# Patient Record
Sex: FEMALE | Race: WHITE | NOT HISPANIC OR LATINO | Employment: UNEMPLOYED | URBAN - METROPOLITAN AREA
[De-identification: names, ages, dates, MRNs, and addresses within clinical notes are randomized per-mention and may not be internally consistent; named-entity substitution may affect disease eponyms.]

---

## 2017-10-29 ENCOUNTER — HOSPITAL ENCOUNTER (OUTPATIENT)
Facility: HOSPITAL | Age: 22
Setting detail: OBSERVATION
Discharge: HOME | End: 2017-10-31
Attending: EMERGENCY MEDICINE | Admitting: INTERNAL MEDICINE

## 2017-10-29 DIAGNOSIS — E86.0 DEHYDRATION: ICD-10-CM

## 2017-10-29 DIAGNOSIS — R79.89 ELEVATED LIVER FUNCTION TESTS: ICD-10-CM

## 2017-10-29 DIAGNOSIS — F10.939 ALCOHOL WITHDRAWAL SYNDROME WITH COMPLICATION (HCC): Primary | ICD-10-CM

## 2017-10-29 LAB
ALBUMIN SERPL-MCNC: 4.6 G/DL (ref 3.2–4.8)
ALBUMIN/GLOB SERPL: 1.7 G/DL (ref 1.5–2.5)
ALP SERPL-CCNC: 232 U/L (ref 25–100)
ALT SERPL W P-5'-P-CCNC: 154 U/L (ref 7–40)
AMPHET+METHAMPHET UR QL: NEGATIVE
AMPHETAMINES UR QL: NEGATIVE
ANION GAP SERPL CALCULATED.3IONS-SCNC: 12 MMOL/L (ref 3–11)
APAP SERPL-MCNC: <10 MCG/ML (ref 0–30)
AST SERPL-CCNC: 489 U/L (ref 0–33)
B-HCG UR QL: NEGATIVE
BACTERIA UR QL AUTO: ABNORMAL /HPF
BARBITURATES UR QL SCN: NEGATIVE
BASOPHILS # BLD AUTO: 0.04 10*3/MM3 (ref 0–0.2)
BASOPHILS NFR BLD AUTO: 0.8 % (ref 0–1)
BENZODIAZ UR QL SCN: NEGATIVE
BILIRUB SERPL-MCNC: 2.2 MG/DL (ref 0.3–1.2)
BILIRUB UR QL STRIP: ABNORMAL
BUN BLD-MCNC: 6 MG/DL (ref 9–23)
BUN/CREAT SERPL: 10 (ref 7–25)
BUPRENORPHINE SERPL-MCNC: NEGATIVE NG/ML
CALCIUM SPEC-SCNC: 9.4 MG/DL (ref 8.7–10.4)
CANNABINOIDS SERPL QL: NEGATIVE
CHLORIDE SERPL-SCNC: 100 MMOL/L (ref 99–109)
CLARITY UR: CLEAR
CO2 SERPL-SCNC: 27 MMOL/L (ref 20–31)
COCAINE UR QL: NEGATIVE
COLOR UR: ABNORMAL
CREAT BLD-MCNC: 0.6 MG/DL (ref 0.6–1.3)
DEPRECATED RDW RBC AUTO: 50.9 FL (ref 37–54)
EOSINOPHIL # BLD AUTO: 0.03 10*3/MM3 (ref 0–0.3)
EOSINOPHIL NFR BLD AUTO: 0.6 % (ref 0–3)
ERYTHROCYTE [DISTWIDTH] IN BLOOD BY AUTOMATED COUNT: 13.4 % (ref 11.3–14.5)
ETHANOL BLD-MCNC: <10 MG/DL (ref 0–10)
GFR SERPL CREATININE-BSD FRML MDRD: 124 ML/MIN/1.73
GLOBULIN UR ELPH-MCNC: 2.7 GM/DL
GLUCOSE BLD-MCNC: 88 MG/DL (ref 70–100)
GLUCOSE UR STRIP-MCNC: NEGATIVE MG/DL
HCT VFR BLD AUTO: 44.7 % (ref 34.5–44)
HGB BLD-MCNC: 15.2 G/DL (ref 11.5–15.5)
HGB UR QL STRIP.AUTO: NEGATIVE
HYALINE CASTS UR QL AUTO: ABNORMAL /LPF
IMM GRANULOCYTES # BLD: 0.01 10*3/MM3 (ref 0–0.03)
IMM GRANULOCYTES NFR BLD: 0.2 % (ref 0–0.6)
INTERNAL NEGATIVE CONTROL: NEGATIVE
INTERNAL POSITIVE CONTROL: POSITIVE
KETONES UR QL STRIP: ABNORMAL
LEUKOCYTE ESTERASE UR QL STRIP.AUTO: ABNORMAL
LIPASE SERPL-CCNC: 69 U/L (ref 6–51)
LYMPHOCYTES # BLD AUTO: 0.44 10*3/MM3 (ref 0.6–4.8)
LYMPHOCYTES NFR BLD AUTO: 9 % (ref 24–44)
Lab: NORMAL
MCH RBC QN AUTO: 35.3 PG (ref 27–31)
MCHC RBC AUTO-ENTMCNC: 34 G/DL (ref 32–36)
MCV RBC AUTO: 104 FL (ref 80–99)
METHADONE UR QL SCN: NEGATIVE
MONOCYTES # BLD AUTO: 0.51 10*3/MM3 (ref 0–1)
MONOCYTES NFR BLD AUTO: 10.4 % (ref 0–12)
NEUTROPHILS # BLD AUTO: 3.86 10*3/MM3 (ref 1.5–8.3)
NEUTROPHILS NFR BLD AUTO: 79 % (ref 41–71)
NITRITE UR QL STRIP: POSITIVE
OPIATES UR QL: NEGATIVE
OXYCODONE UR QL SCN: NEGATIVE
PCP UR QL SCN: NEGATIVE
PH UR STRIP.AUTO: 6.5 [PH] (ref 5–8)
PLATELET # BLD AUTO: 92 10*3/MM3 (ref 150–450)
PMV BLD AUTO: 10 FL (ref 6–12)
POTASSIUM BLD-SCNC: 3.4 MMOL/L (ref 3.5–5.5)
PROPOXYPH UR QL: NEGATIVE
PROT SERPL-MCNC: 7.3 G/DL (ref 5.7–8.2)
PROT UR QL STRIP: ABNORMAL
RBC # BLD AUTO: 4.3 10*6/MM3 (ref 3.89–5.14)
RBC # UR: ABNORMAL /HPF
REF LAB TEST METHOD: ABNORMAL
SALICYLATES SERPL-MCNC: <1 MG/DL (ref 0–29)
SODIUM BLD-SCNC: 139 MMOL/L (ref 132–146)
SP GR UR STRIP: 1.02 (ref 1–1.03)
SQUAMOUS #/AREA URNS HPF: ABNORMAL /HPF
TRICYCLICS UR QL SCN: NEGATIVE
UROBILINOGEN UR QL STRIP: ABNORMAL
WBC NRBC COR # BLD: 4.89 10*3/MM3 (ref 3.5–10.8)
WBC UR QL AUTO: ABNORMAL /HPF

## 2017-10-29 PROCEDURE — 80306 DRUG TEST PRSMV INSTRMNT: CPT | Performed by: EMERGENCY MEDICINE

## 2017-10-29 PROCEDURE — 96376 TX/PRO/DX INJ SAME DRUG ADON: CPT

## 2017-10-29 PROCEDURE — 96375 TX/PRO/DX INJ NEW DRUG ADDON: CPT

## 2017-10-29 PROCEDURE — 99220 PR INITIAL OBSERVATION CARE/DAY 70 MINUTES: CPT | Performed by: INTERNAL MEDICINE

## 2017-10-29 PROCEDURE — 25010000002 KETOROLAC TROMETHAMINE PER 15 MG: Performed by: EMERGENCY MEDICINE

## 2017-10-29 PROCEDURE — 96361 HYDRATE IV INFUSION ADD-ON: CPT

## 2017-10-29 PROCEDURE — 25010000002 LORAZEPAM PER 2 MG: Performed by: EMERGENCY MEDICINE

## 2017-10-29 PROCEDURE — 83690 ASSAY OF LIPASE: CPT | Performed by: EMERGENCY MEDICINE

## 2017-10-29 PROCEDURE — G0378 HOSPITAL OBSERVATION PER HR: HCPCS

## 2017-10-29 PROCEDURE — 25010000002 LORAZEPAM PER 2 MG: Performed by: INTERNAL MEDICINE

## 2017-10-29 PROCEDURE — 80307 DRUG TEST PRSMV CHEM ANLYZR: CPT | Performed by: EMERGENCY MEDICINE

## 2017-10-29 PROCEDURE — 99284 EMERGENCY DEPT VISIT MOD MDM: CPT

## 2017-10-29 PROCEDURE — 81001 URINALYSIS AUTO W/SCOPE: CPT | Performed by: EMERGENCY MEDICINE

## 2017-10-29 PROCEDURE — 96365 THER/PROPH/DIAG IV INF INIT: CPT

## 2017-10-29 PROCEDURE — 96366 THER/PROPH/DIAG IV INF ADDON: CPT

## 2017-10-29 PROCEDURE — 25010000002 MAGNESIUM SULFATE PER 500 MG OF MAGNESIUM: Performed by: INTERNAL MEDICINE

## 2017-10-29 PROCEDURE — 85025 COMPLETE CBC W/AUTO DIFF WBC: CPT | Performed by: EMERGENCY MEDICINE

## 2017-10-29 PROCEDURE — 87086 URINE CULTURE/COLONY COUNT: CPT | Performed by: EMERGENCY MEDICINE

## 2017-10-29 PROCEDURE — 96374 THER/PROPH/DIAG INJ IV PUSH: CPT

## 2017-10-29 PROCEDURE — 80053 COMPREHEN METABOLIC PANEL: CPT | Performed by: EMERGENCY MEDICINE

## 2017-10-29 PROCEDURE — 25010000002 THIAMINE PER 100 MG: Performed by: INTERNAL MEDICINE

## 2017-10-29 RX ORDER — HYDROXYZINE PAMOATE 50 MG/1
50 CAPSULE ORAL DAILY PRN
COMMUNITY
End: 2018-07-30

## 2017-10-29 RX ORDER — CLOBETASOL PROPIONATE 0.5 MG/G
CREAM TOPICAL EVERY 12 HOURS SCHEDULED
Status: DISCONTINUED | OUTPATIENT
Start: 2017-10-29 | End: 2017-10-31 | Stop reason: HOSPADM

## 2017-10-29 RX ORDER — LORAZEPAM 2 MG/ML
1 INJECTION INTRAMUSCULAR EVERY 8 HOURS
Status: DISCONTINUED | OUTPATIENT
Start: 2017-10-30 | End: 2017-10-31

## 2017-10-29 RX ORDER — ONDANSETRON 2 MG/ML
4 INJECTION INTRAMUSCULAR; INTRAVENOUS EVERY 6 HOURS PRN
Status: DISCONTINUED | OUTPATIENT
Start: 2017-10-29 | End: 2017-10-31 | Stop reason: HOSPADM

## 2017-10-29 RX ORDER — CLOBETASOL PROPIONATE 0.5 MG/G
1 CREAM TOPICAL 2 TIMES DAILY
COMMUNITY
End: 2018-07-30

## 2017-10-29 RX ORDER — DEXTROSE AND SODIUM CHLORIDE 5; .45 G/100ML; G/100ML
125 INJECTION, SOLUTION INTRAVENOUS CONTINUOUS
Status: DISCONTINUED | OUTPATIENT
Start: 2017-10-29 | End: 2017-10-31

## 2017-10-29 RX ORDER — SODIUM CHLORIDE 9 MG/ML
250 INJECTION, SOLUTION INTRAVENOUS CONTINUOUS
Status: DISCONTINUED | OUTPATIENT
Start: 2017-10-29 | End: 2017-10-30

## 2017-10-29 RX ORDER — KETOROLAC TROMETHAMINE 15 MG/ML
10 INJECTION, SOLUTION INTRAMUSCULAR; INTRAVENOUS ONCE
Status: COMPLETED | OUTPATIENT
Start: 2017-10-29 | End: 2017-10-29

## 2017-10-29 RX ORDER — ALBUTEROL SULFATE 90 UG/1
2 AEROSOL, METERED RESPIRATORY (INHALATION) EVERY 4 HOURS PRN
Status: DISCONTINUED | OUTPATIENT
Start: 2017-10-29 | End: 2017-10-31 | Stop reason: HOSPADM

## 2017-10-29 RX ORDER — SODIUM CHLORIDE 0.9 % (FLUSH) 0.9 %
1-10 SYRINGE (ML) INJECTION AS NEEDED
Status: DISCONTINUED | OUTPATIENT
Start: 2017-10-29 | End: 2017-10-31 | Stop reason: HOSPADM

## 2017-10-29 RX ORDER — HYDROXYZINE HYDROCHLORIDE 25 MG/1
50 TABLET, FILM COATED ORAL EVERY 6 HOURS PRN
Status: DISCONTINUED | OUTPATIENT
Start: 2017-10-29 | End: 2017-10-31 | Stop reason: HOSPADM

## 2017-10-29 RX ORDER — LORAZEPAM 2 MG/ML
1 INJECTION INTRAMUSCULAR EVERY 6 HOURS
Status: COMPLETED | OUTPATIENT
Start: 2017-10-29 | End: 2017-10-30

## 2017-10-29 RX ORDER — POTASSIUM CHLORIDE 1.5 G/1.77G
40 POWDER, FOR SOLUTION ORAL AS NEEDED
Status: DISCONTINUED | OUTPATIENT
Start: 2017-10-29 | End: 2017-10-31 | Stop reason: HOSPADM

## 2017-10-29 RX ORDER — POTASSIUM CHLORIDE 750 MG/1
40 CAPSULE, EXTENDED RELEASE ORAL AS NEEDED
Status: DISCONTINUED | OUTPATIENT
Start: 2017-10-29 | End: 2017-10-31 | Stop reason: HOSPADM

## 2017-10-29 RX ORDER — LORAZEPAM 2 MG/ML
0.5 INJECTION INTRAMUSCULAR ONCE
Status: COMPLETED | OUTPATIENT
Start: 2017-10-29 | End: 2017-10-29

## 2017-10-29 RX ORDER — POTASSIUM CHLORIDE 7.45 MG/ML
10 INJECTION INTRAVENOUS
Status: DISCONTINUED | OUTPATIENT
Start: 2017-10-29 | End: 2017-10-31 | Stop reason: HOSPADM

## 2017-10-29 RX ORDER — ALBUTEROL SULFATE 90 UG/1
2 AEROSOL, METERED RESPIRATORY (INHALATION) EVERY 4 HOURS PRN
Status: ON HOLD | COMMUNITY
End: 2019-03-29

## 2017-10-29 RX ORDER — ONDANSETRON 4 MG/1
4 TABLET, FILM COATED ORAL EVERY 6 HOURS PRN
Status: DISCONTINUED | OUTPATIENT
Start: 2017-10-29 | End: 2017-10-31 | Stop reason: HOSPADM

## 2017-10-29 RX ORDER — ACETAMINOPHEN 325 MG/1
650 TABLET ORAL ONCE
Status: COMPLETED | OUTPATIENT
Start: 2017-10-29 | End: 2017-10-29

## 2017-10-29 RX ADMIN — CLOBETASOL PROPIONATE: 0.5 CREAM TOPICAL at 21:48

## 2017-10-29 RX ADMIN — SODIUM CHLORIDE 1000 ML: 9 INJECTION, SOLUTION INTRAVENOUS at 11:28

## 2017-10-29 RX ADMIN — DEXTROSE AND SODIUM CHLORIDE 150 ML/HR: 5; 450 INJECTION, SOLUTION INTRAVENOUS at 16:23

## 2017-10-29 RX ADMIN — SODIUM CHLORIDE 250 ML/HR: 9 INJECTION, SOLUTION INTRAVENOUS at 16:01

## 2017-10-29 RX ADMIN — LORAZEPAM 0.5 MG: 2 INJECTION INTRAMUSCULAR; INTRAVENOUS at 15:00

## 2017-10-29 RX ADMIN — SODIUM CHLORIDE 1000 ML: 9 INJECTION, SOLUTION INTRAVENOUS at 13:38

## 2017-10-29 RX ADMIN — POTASSIUM CHLORIDE 40 MEQ: 750 CAPSULE, EXTENDED RELEASE ORAL at 21:31

## 2017-10-29 RX ADMIN — LORAZEPAM 1 MG: 2 INJECTION INTRAMUSCULAR; INTRAVENOUS at 16:45

## 2017-10-29 RX ADMIN — LORAZEPAM 1 MG: 2 INJECTION INTRAMUSCULAR; INTRAVENOUS at 21:32

## 2017-10-29 RX ADMIN — KETOROLAC TROMETHAMINE 10 MG: 15 INJECTION, SOLUTION INTRAMUSCULAR; INTRAVENOUS at 14:04

## 2017-10-29 RX ADMIN — DEXTROSE AND SODIUM CHLORIDE 150 ML/HR: 5; 450 INJECTION, SOLUTION INTRAVENOUS at 23:11

## 2017-10-29 RX ADMIN — THIAMINE HYDROCHLORIDE 100 ML/HR: 100 INJECTION, SOLUTION INTRAMUSCULAR; INTRAVENOUS at 16:45

## 2017-10-29 RX ADMIN — ACETAMINOPHEN 650 MG: 325 TABLET, FILM COATED ORAL at 13:38

## 2017-10-30 LAB
ALBUMIN SERPL-MCNC: 3.2 G/DL (ref 3.2–4.8)
ALBUMIN/GLOB SERPL: 1.6 G/DL (ref 1.5–2.5)
ALP SERPL-CCNC: 170 U/L (ref 25–100)
ALT SERPL W P-5'-P-CCNC: 104 U/L (ref 7–40)
ANION GAP SERPL CALCULATED.3IONS-SCNC: 4 MMOL/L (ref 3–11)
AST SERPL-CCNC: 323 U/L (ref 0–33)
BILIRUB SERPL-MCNC: 2.4 MG/DL (ref 0.3–1.2)
BILIRUB UR QL STRIP: NEGATIVE
BUN BLD-MCNC: <5 MG/DL (ref 9–23)
BUN/CREAT SERPL: ABNORMAL (ref 7–25)
CALCIUM SPEC-SCNC: 7.9 MG/DL (ref 8.7–10.4)
CHLORIDE SERPL-SCNC: 106 MMOL/L (ref 99–109)
CLARITY UR: CLEAR
CO2 SERPL-SCNC: 25 MMOL/L (ref 20–31)
COLOR UR: YELLOW
CREAT BLD-MCNC: 0.4 MG/DL (ref 0.6–1.3)
GFR SERPL CREATININE-BSD FRML MDRD: >150 ML/MIN/1.73
GLOBULIN UR ELPH-MCNC: 2 GM/DL
GLUCOSE BLD-MCNC: 100 MG/DL (ref 70–100)
GLUCOSE UR STRIP-MCNC: NEGATIVE MG/DL
HGB UR QL STRIP.AUTO: NEGATIVE
INR PPP: 1.22
KETONES UR QL STRIP: NEGATIVE
LEUKOCYTE ESTERASE UR QL STRIP.AUTO: NEGATIVE
MAGNESIUM SERPL-MCNC: 1.9 MG/DL (ref 1.3–2.7)
NITRITE UR QL STRIP: NEGATIVE
PH UR STRIP.AUTO: 7 [PH] (ref 5–8)
PHOSPHATE SERPL-MCNC: 2.1 MG/DL (ref 2.4–5.1)
POTASSIUM BLD-SCNC: 4 MMOL/L (ref 3.5–5.5)
PROT SERPL-MCNC: 5.2 G/DL (ref 5.7–8.2)
PROT UR QL STRIP: NEGATIVE
PROTHROMBIN TIME: 13.4 SECONDS (ref 9.6–11.5)
SODIUM BLD-SCNC: 135 MMOL/L (ref 132–146)
SP GR UR STRIP: <=1.005 (ref 1–1.03)
UROBILINOGEN UR QL STRIP: NORMAL

## 2017-10-30 PROCEDURE — 83735 ASSAY OF MAGNESIUM: CPT | Performed by: INTERNAL MEDICINE

## 2017-10-30 PROCEDURE — 84100 ASSAY OF PHOSPHORUS: CPT | Performed by: INTERNAL MEDICINE

## 2017-10-30 PROCEDURE — 25010000002 LORAZEPAM PER 2 MG: Performed by: INTERNAL MEDICINE

## 2017-10-30 PROCEDURE — 96366 THER/PROPH/DIAG IV INF ADDON: CPT

## 2017-10-30 PROCEDURE — 25010000002 THIAMINE PER 100 MG: Performed by: INTERNAL MEDICINE

## 2017-10-30 PROCEDURE — 25010000002 MAGNESIUM SULFATE PER 500 MG OF MAGNESIUM: Performed by: INTERNAL MEDICINE

## 2017-10-30 PROCEDURE — 80053 COMPREHEN METABOLIC PANEL: CPT | Performed by: INTERNAL MEDICINE

## 2017-10-30 PROCEDURE — G0378 HOSPITAL OBSERVATION PER HR: HCPCS

## 2017-10-30 PROCEDURE — 85610 PROTHROMBIN TIME: CPT | Performed by: INTERNAL MEDICINE

## 2017-10-30 PROCEDURE — 96376 TX/PRO/DX INJ SAME DRUG ADON: CPT

## 2017-10-30 PROCEDURE — 81003 URINALYSIS AUTO W/O SCOPE: CPT | Performed by: NURSE PRACTITIONER

## 2017-10-30 PROCEDURE — 99225 PR SBSQ OBSERVATION CARE/DAY 25 MINUTES: CPT | Performed by: NURSE PRACTITIONER

## 2017-10-30 RX ADMIN — LORAZEPAM 1 MG: 2 INJECTION INTRAMUSCULAR; INTRAVENOUS at 16:35

## 2017-10-30 RX ADMIN — POTASSIUM CHLORIDE 40 MEQ: 750 CAPSULE, EXTENDED RELEASE ORAL at 02:40

## 2017-10-30 RX ADMIN — DEXTROSE AND SODIUM CHLORIDE 125 ML/HR: 5; 450 INJECTION, SOLUTION INTRAVENOUS at 18:51

## 2017-10-30 RX ADMIN — LORAZEPAM 1 MG: 2 INJECTION INTRAMUSCULAR; INTRAVENOUS at 09:44

## 2017-10-30 RX ADMIN — THIAMINE HYDROCHLORIDE 100 ML/HR: 100 INJECTION, SOLUTION INTRAMUSCULAR; INTRAVENOUS at 09:44

## 2017-10-30 RX ADMIN — DEXTROSE AND SODIUM CHLORIDE 150 ML/HR: 5; 450 INJECTION, SOLUTION INTRAVENOUS at 05:39

## 2017-10-30 RX ADMIN — POTASSIUM & SODIUM PHOSPHATES POWDER PACK 280-160-250 MG 2 PACKET: 280-160-250 PACK at 15:16

## 2017-10-30 RX ADMIN — CLOBETASOL PROPIONATE: 0.5 CREAM TOPICAL at 09:45

## 2017-10-30 RX ADMIN — DEXTROSE AND SODIUM CHLORIDE 150 ML/HR: 5; 450 INJECTION, SOLUTION INTRAVENOUS at 12:27

## 2017-10-30 RX ADMIN — LORAZEPAM 1 MG: 2 INJECTION INTRAMUSCULAR; INTRAVENOUS at 03:54

## 2017-10-30 RX ADMIN — CLOBETASOL PROPIONATE: 0.5 CREAM TOPICAL at 20:52

## 2017-10-30 RX ADMIN — PHENOL 2 SPRAY: 1.5 LIQUID ORAL at 06:46

## 2017-10-31 VITALS
OXYGEN SATURATION: 94 % | BODY MASS INDEX: 19.07 KG/M2 | RESPIRATION RATE: 20 BRPM | HEART RATE: 110 BPM | DIASTOLIC BLOOD PRESSURE: 66 MMHG | TEMPERATURE: 99.1 F | SYSTOLIC BLOOD PRESSURE: 100 MMHG | HEIGHT: 70 IN | WEIGHT: 133.2 LBS

## 2017-10-31 LAB
ALBUMIN SERPL-MCNC: 3.4 G/DL (ref 3.2–4.8)
ALBUMIN/GLOB SERPL: 1.6 G/DL (ref 1.5–2.5)
ALP SERPL-CCNC: 198 U/L (ref 25–100)
ALT SERPL W P-5'-P-CCNC: 85 U/L (ref 7–40)
ANION GAP SERPL CALCULATED.3IONS-SCNC: 8 MMOL/L (ref 3–11)
AST SERPL-CCNC: 206 U/L (ref 0–33)
BACTERIA SPEC AEROBE CULT: NORMAL
BILIRUB SERPL-MCNC: 2.8 MG/DL (ref 0.3–1.2)
BUN BLD-MCNC: <5 MG/DL (ref 9–23)
BUN/CREAT SERPL: ABNORMAL (ref 7–25)
CALCIUM SPEC-SCNC: 8.5 MG/DL (ref 8.7–10.4)
CHLORIDE SERPL-SCNC: 102 MMOL/L (ref 99–109)
CO2 SERPL-SCNC: 25 MMOL/L (ref 20–31)
CREAT BLD-MCNC: 0.4 MG/DL (ref 0.6–1.3)
DEPRECATED RDW RBC AUTO: 52.5 FL (ref 37–54)
ERYTHROCYTE [DISTWIDTH] IN BLOOD BY AUTOMATED COUNT: 13.5 % (ref 11.3–14.5)
GFR SERPL CREATININE-BSD FRML MDRD: >150 ML/MIN/1.73
GLOBULIN UR ELPH-MCNC: 2.1 GM/DL
GLUCOSE BLD-MCNC: 98 MG/DL (ref 70–100)
HCT VFR BLD AUTO: 36.5 % (ref 34.5–44)
HGB BLD-MCNC: 12.6 G/DL (ref 11.5–15.5)
MCH RBC QN AUTO: 36.5 PG (ref 27–31)
MCHC RBC AUTO-ENTMCNC: 34.5 G/DL (ref 32–36)
MCV RBC AUTO: 105.8 FL (ref 80–99)
PHOSPHATE SERPL-MCNC: 3.3 MG/DL (ref 2.4–5.1)
PLATELET # BLD AUTO: 64 10*3/MM3 (ref 150–450)
PMV BLD AUTO: 11.4 FL (ref 6–12)
POTASSIUM BLD-SCNC: 3.6 MMOL/L (ref 3.5–5.5)
PROT SERPL-MCNC: 5.5 G/DL (ref 5.7–8.2)
RBC # BLD AUTO: 3.45 10*6/MM3 (ref 3.89–5.14)
SODIUM BLD-SCNC: 135 MMOL/L (ref 132–146)
WBC NRBC COR # BLD: 4.48 10*3/MM3 (ref 3.5–10.8)

## 2017-10-31 PROCEDURE — 25010000002 MAGNESIUM SULFATE PER 500 MG OF MAGNESIUM: Performed by: INTERNAL MEDICINE

## 2017-10-31 PROCEDURE — 84100 ASSAY OF PHOSPHORUS: CPT | Performed by: NURSE PRACTITIONER

## 2017-10-31 PROCEDURE — 99217 PR OBSERVATION CARE DISCHARGE MANAGEMENT: CPT | Performed by: INTERNAL MEDICINE

## 2017-10-31 PROCEDURE — G0378 HOSPITAL OBSERVATION PER HR: HCPCS

## 2017-10-31 PROCEDURE — 80053 COMPREHEN METABOLIC PANEL: CPT | Performed by: NURSE PRACTITIONER

## 2017-10-31 PROCEDURE — 25010000002 THIAMINE PER 100 MG: Performed by: INTERNAL MEDICINE

## 2017-10-31 PROCEDURE — 25010000002 LORAZEPAM PER 2 MG: Performed by: INTERNAL MEDICINE

## 2017-10-31 PROCEDURE — 85027 COMPLETE CBC AUTOMATED: CPT | Performed by: NURSE PRACTITIONER

## 2017-10-31 RX ORDER — LORAZEPAM 1 MG/1
1 TABLET ORAL EVERY 8 HOURS PRN
Status: DISCONTINUED | OUTPATIENT
Start: 2017-10-31 | End: 2017-10-31 | Stop reason: HOSPADM

## 2017-10-31 RX ORDER — LORAZEPAM 1 MG/1
1 TABLET ORAL EVERY 8 HOURS PRN
Qty: 6 TABLET | Refills: 0 | Status: SHIPPED | OUTPATIENT
Start: 2017-10-31 | End: 2018-07-30

## 2017-10-31 RX ADMIN — LORAZEPAM 1 MG: 2 INJECTION INTRAMUSCULAR; INTRAVENOUS at 00:56

## 2017-10-31 RX ADMIN — DEXTROSE AND SODIUM CHLORIDE 125 ML/HR: 5; 450 INJECTION, SOLUTION INTRAVENOUS at 02:39

## 2017-10-31 RX ADMIN — THIAMINE HYDROCHLORIDE 100 ML/HR: 100 INJECTION, SOLUTION INTRAMUSCULAR; INTRAVENOUS at 08:04

## 2017-10-31 RX ADMIN — CLOBETASOL PROPIONATE: 0.5 CREAM TOPICAL at 10:25

## 2017-11-10 ENCOUNTER — LAB REQUISITION (OUTPATIENT)
Dept: ADMINISTRATIVE | Age: 22
End: 2017-11-10
Payer: COMMERCIAL

## 2017-11-10 DIAGNOSIS — F33.2 SEVERE RECURRENT MAJOR DEPRESSION WITHOUT PSYCHOTIC FEATURES (HCC): ICD-10-CM

## 2017-11-10 DIAGNOSIS — F10.10 ALCOHOL ABUSE: ICD-10-CM

## 2017-11-10 DIAGNOSIS — F50.9 EATING DISORDER: ICD-10-CM

## 2017-11-10 PROCEDURE — 81025 URINE PREGNANCY TEST: CPT | Performed by: OTHER

## 2017-11-10 PROCEDURE — 81001 URINALYSIS AUTO W/SCOPE: CPT | Performed by: OTHER

## 2017-11-11 ENCOUNTER — LAB REQUISITION (OUTPATIENT)
Dept: ADMINISTRATIVE | Age: 22
End: 2017-11-11
Payer: COMMERCIAL

## 2017-11-11 DIAGNOSIS — F10.10 ALCOHOL ABUSE: ICD-10-CM

## 2017-11-11 DIAGNOSIS — F50.9 EATING DISORDER: ICD-10-CM

## 2017-11-11 DIAGNOSIS — F33.2 SEVERE RECURRENT MAJOR DEPRESSION WITHOUT PSYCHOTIC FEATURES (HCC): ICD-10-CM

## 2017-11-13 PROCEDURE — 84100 ASSAY OF PHOSPHORUS: CPT | Performed by: OTHER

## 2017-11-13 PROCEDURE — 82150 ASSAY OF AMYLASE: CPT | Performed by: OTHER

## 2017-11-13 PROCEDURE — 80053 COMPREHEN METABOLIC PANEL: CPT | Performed by: OTHER

## 2017-11-13 PROCEDURE — 82248 BILIRUBIN DIRECT: CPT | Performed by: OTHER

## 2017-11-13 PROCEDURE — 83735 ASSAY OF MAGNESIUM: CPT | Performed by: OTHER

## 2017-11-13 PROCEDURE — 82652 VIT D 1 25-DIHYDROXY: CPT | Performed by: OTHER

## 2017-11-13 PROCEDURE — 85025 COMPLETE CBC W/AUTO DIFF WBC: CPT | Performed by: OTHER

## 2017-11-13 PROCEDURE — 84443 ASSAY THYROID STIM HORMONE: CPT | Performed by: OTHER

## 2017-11-13 PROCEDURE — 36415 COLL VENOUS BLD VENIPUNCTURE: CPT | Performed by: OTHER

## 2017-11-13 PROCEDURE — 82607 VITAMIN B-12: CPT | Performed by: OTHER

## 2017-11-13 PROCEDURE — 84134 ASSAY OF PREALBUMIN: CPT | Performed by: OTHER

## 2017-11-14 ENCOUNTER — LAB REQUISITION (OUTPATIENT)
Dept: ADMINISTRATIVE | Age: 22
End: 2017-11-14
Payer: COMMERCIAL

## 2017-11-14 DIAGNOSIS — F33.2 MAJOR DEPRESSIVE DISORDER, RECURRENT, SEVERE WITHOUT PSYCHOTIC FEATURES (HCC): ICD-10-CM

## 2017-11-16 PROCEDURE — 82607 VITAMIN B-12: CPT | Performed by: OTHER

## 2017-11-16 PROCEDURE — 80053 COMPREHEN METABOLIC PANEL: CPT | Performed by: OTHER

## 2017-11-16 PROCEDURE — 36415 COLL VENOUS BLD VENIPUNCTURE: CPT | Performed by: OTHER

## 2017-11-22 ENCOUNTER — LAB REQUISITION (OUTPATIENT)
Dept: ADMINISTRATIVE | Age: 22
End: 2017-11-22
Payer: COMMERCIAL

## 2017-11-22 ENCOUNTER — LAB ENCOUNTER (OUTPATIENT)
Dept: LAB | Facility: HOSPITAL | Age: 22
End: 2017-11-22
Attending: Other
Payer: COMMERCIAL

## 2017-11-22 ENCOUNTER — LAB REQUISITION (OUTPATIENT)
Dept: LAB | Facility: HOSPITAL | Age: 22
End: 2017-11-22
Payer: COMMERCIAL

## 2017-11-22 DIAGNOSIS — F50.9 EATING DISORDER: ICD-10-CM

## 2017-11-22 DIAGNOSIS — Z01.89 ENCOUNTER FOR OTHER SPECIFIED SPECIAL EXAMINATIONS: ICD-10-CM

## 2017-11-22 PROCEDURE — 80048 BASIC METABOLIC PNL TOTAL CA: CPT | Performed by: OTHER

## 2017-11-22 PROCEDURE — 84100 ASSAY OF PHOSPHORUS: CPT | Performed by: OTHER

## 2017-11-22 PROCEDURE — 86664 EPSTEIN-BARR NUCLEAR ANTIGEN: CPT | Performed by: OTHER

## 2017-11-22 PROCEDURE — 36415 COLL VENOUS BLD VENIPUNCTURE: CPT | Performed by: OTHER

## 2017-11-22 PROCEDURE — 86706 HEP B SURFACE ANTIBODY: CPT | Performed by: OTHER

## 2017-11-22 PROCEDURE — 86645 CMV ANTIBODY IGM: CPT | Performed by: OTHER

## 2017-11-22 PROCEDURE — 86803 HEPATITIS C AB TEST: CPT | Performed by: OTHER

## 2017-11-22 PROCEDURE — 86665 EPSTEIN-BARR CAPSID VCA: CPT | Performed by: OTHER

## 2017-11-22 PROCEDURE — 83735 ASSAY OF MAGNESIUM: CPT | Performed by: OTHER

## 2017-11-22 PROCEDURE — 86255 FLUORESCENT ANTIBODY SCREEN: CPT | Performed by: OTHER

## 2017-11-22 PROCEDURE — 86709 HEPATITIS A IGM ANTIBODY: CPT | Performed by: OTHER

## 2017-11-22 PROCEDURE — 87340 HEPATITIS B SURFACE AG IA: CPT | Performed by: OTHER

## 2017-11-22 PROCEDURE — 82390 ASSAY OF CERULOPLASMIN: CPT | Performed by: OTHER

## 2017-11-22 PROCEDURE — 82150 ASSAY OF AMYLASE: CPT | Performed by: OTHER

## 2017-11-26 ENCOUNTER — LAB REQUISITION (OUTPATIENT)
Dept: ADMINISTRATIVE | Age: 22
End: 2017-11-26
Payer: COMMERCIAL

## 2017-11-26 DIAGNOSIS — F39 MOOD DISORDER (HCC): ICD-10-CM

## 2017-11-27 ENCOUNTER — LAB REQUISITION (OUTPATIENT)
Dept: ADMINISTRATIVE | Age: 22
End: 2017-11-27
Payer: COMMERCIAL

## 2017-11-27 DIAGNOSIS — F39 MOOD DISORDER (HCC): ICD-10-CM

## 2017-11-27 PROCEDURE — 36415 COLL VENOUS BLD VENIPUNCTURE: CPT | Performed by: OTHER

## 2017-11-27 PROCEDURE — 84100 ASSAY OF PHOSPHORUS: CPT | Performed by: OTHER

## 2017-11-27 PROCEDURE — 80048 BASIC METABOLIC PNL TOTAL CA: CPT | Performed by: OTHER

## 2017-11-27 PROCEDURE — 80076 HEPATIC FUNCTION PANEL: CPT | Performed by: OTHER

## 2017-11-27 PROCEDURE — 83735 ASSAY OF MAGNESIUM: CPT | Performed by: OTHER

## 2017-11-27 PROCEDURE — 82150 ASSAY OF AMYLASE: CPT | Performed by: OTHER

## 2017-11-28 PROCEDURE — 82728 ASSAY OF FERRITIN: CPT | Performed by: OTHER

## 2017-11-28 PROCEDURE — 36415 COLL VENOUS BLD VENIPUNCTURE: CPT | Performed by: OTHER

## 2017-11-28 PROCEDURE — 85610 PROTHROMBIN TIME: CPT | Performed by: OTHER

## 2018-07-30 ENCOUNTER — APPOINTMENT (OUTPATIENT)
Dept: GENERAL RADIOLOGY | Facility: HOSPITAL | Age: 23
End: 2018-07-30

## 2018-07-30 ENCOUNTER — APPOINTMENT (OUTPATIENT)
Dept: ULTRASOUND IMAGING | Facility: HOSPITAL | Age: 23
End: 2018-07-30

## 2018-07-30 ENCOUNTER — HOSPITAL ENCOUNTER (INPATIENT)
Facility: HOSPITAL | Age: 23
LOS: 3 days | Discharge: HOME | End: 2018-08-02
Attending: EMERGENCY MEDICINE | Admitting: HOSPITALIST

## 2018-07-30 DIAGNOSIS — K70.10 ACUTE ALCOHOLIC HEPATITIS: Primary | ICD-10-CM

## 2018-07-30 PROBLEM — R10.9 ABDOMINAL PAIN: Status: ACTIVE | Noted: 2018-07-30

## 2018-07-30 PROBLEM — R11.2 NON-INTRACTABLE VOMITING WITH NAUSEA: Status: ACTIVE | Noted: 2018-07-30

## 2018-07-30 LAB
ALBUMIN SERPL-MCNC: 3.79 G/DL (ref 3.2–4.8)
ALBUMIN/GLOB SERPL: 0.8 G/DL (ref 1.5–2.5)
ALP SERPL-CCNC: 202 U/L (ref 25–100)
ALT SERPL W P-5'-P-CCNC: 52 U/L (ref 7–40)
AMPHET+METHAMPHET UR QL: NEGATIVE
AMPHETAMINES UR QL: NEGATIVE
ANION GAP SERPL CALCULATED.3IONS-SCNC: 13 MMOL/L (ref 3–11)
AST SERPL-CCNC: 249 U/L (ref 0–33)
B-HCG UR QL: NEGATIVE
BACTERIA UR QL AUTO: ABNORMAL /HPF
BARBITURATES UR QL SCN: NEGATIVE
BASOPHILS # BLD AUTO: 0.04 10*3/MM3 (ref 0–0.2)
BASOPHILS NFR BLD AUTO: 0.9 % (ref 0–1)
BENZODIAZ UR QL SCN: NEGATIVE
BILIRUB SERPL-MCNC: 5.9 MG/DL (ref 0.3–1.2)
BILIRUB UR QL STRIP: ABNORMAL
BUN BLD-MCNC: <5 MG/DL (ref 9–23)
BUN/CREAT SERPL: ABNORMAL (ref 7–25)
BUPRENORPHINE SERPL-MCNC: NEGATIVE NG/ML
CALCIUM SPEC-SCNC: 9.2 MG/DL (ref 8.7–10.4)
CANNABINOIDS SERPL QL: NEGATIVE
CHLORIDE SERPL-SCNC: 102 MMOL/L (ref 99–109)
CLARITY UR: ABNORMAL
CO2 SERPL-SCNC: 27 MMOL/L (ref 20–31)
COCAINE UR QL: NEGATIVE
COLOR UR: ABNORMAL
CREAT BLD-MCNC: 0.52 MG/DL (ref 0.6–1.3)
DEPRECATED RDW RBC AUTO: 49.5 FL (ref 37–54)
EOSINOPHIL # BLD AUTO: 0.02 10*3/MM3 (ref 0–0.3)
EOSINOPHIL NFR BLD AUTO: 0.5 % (ref 0–3)
ERYTHROCYTE [DISTWIDTH] IN BLOOD BY AUTOMATED COUNT: 13.5 % (ref 11.3–14.5)
ETHANOL BLD-MCNC: <10 MG/DL (ref 0–10)
GFR SERPL CREATININE-BSD FRML MDRD: 145 ML/MIN/1.73
GLOBULIN UR ELPH-MCNC: 4.7 GM/DL
GLUCOSE BLD-MCNC: 97 MG/DL (ref 70–100)
GLUCOSE UR STRIP-MCNC: NEGATIVE MG/DL
HAV IGM SERPL QL IA: NORMAL
HBV CORE IGM SERPL QL IA: NORMAL
HBV SURFACE AG SERPL QL IA: NORMAL
HCT VFR BLD AUTO: 38.2 % (ref 34.5–44)
HCV AB SER DONR QL: NORMAL
HGB BLD-MCNC: 13.1 G/DL (ref 11.5–15.5)
HGB UR QL STRIP.AUTO: NEGATIVE
HOLD SPECIMEN: NORMAL
HOLD SPECIMEN: NORMAL
IMM GRANULOCYTES # BLD: 0.03 10*3/MM3 (ref 0–0.03)
IMM GRANULOCYTES NFR BLD: 0.7 % (ref 0–0.6)
INTERNAL NEGATIVE CONTROL: NEGATIVE
INTERNAL POSITIVE CONTROL: POSITIVE
KETONES UR QL STRIP: NEGATIVE
LEUKOCYTE ESTERASE UR QL STRIP.AUTO: ABNORMAL
LIPASE SERPL-CCNC: 57 U/L (ref 6–51)
LYMPHOCYTES # BLD AUTO: 0.62 10*3/MM3 (ref 0.6–4.8)
LYMPHOCYTES NFR BLD AUTO: 14.1 % (ref 24–44)
Lab: NORMAL
MCH RBC QN AUTO: 34.3 PG (ref 27–31)
MCHC RBC AUTO-ENTMCNC: 34.3 G/DL (ref 32–36)
MCV RBC AUTO: 100 FL (ref 80–99)
METHADONE UR QL SCN: NEGATIVE
MONOCYTES # BLD AUTO: 0.43 10*3/MM3 (ref 0–1)
MONOCYTES NFR BLD AUTO: 9.8 % (ref 0–12)
NEUTROPHILS # BLD AUTO: 3.29 10*3/MM3 (ref 1.5–8.3)
NEUTROPHILS NFR BLD AUTO: 74.7 % (ref 41–71)
NITRITE UR QL STRIP: POSITIVE
OPIATES UR QL: NEGATIVE
OXYCODONE UR QL SCN: NEGATIVE
PCP UR QL SCN: NEGATIVE
PH UR STRIP.AUTO: 8.5 [PH] (ref 5–8)
PLATELET # BLD AUTO: 51 10*3/MM3 (ref 150–450)
PMV BLD AUTO: 11.8 FL (ref 6–12)
POTASSIUM BLD-SCNC: 3.7 MMOL/L (ref 3.5–5.5)
PROPOXYPH UR QL: NEGATIVE
PROT SERPL-MCNC: 8.5 G/DL (ref 5.7–8.2)
PROT UR QL STRIP: ABNORMAL
RBC # BLD AUTO: 3.82 10*6/MM3 (ref 3.89–5.14)
RBC # UR: ABNORMAL /HPF
REF LAB TEST METHOD: ABNORMAL
SODIUM BLD-SCNC: 142 MMOL/L (ref 132–146)
SP GR UR STRIP: 1.02 (ref 1–1.03)
SQUAMOUS #/AREA URNS HPF: ABNORMAL /HPF
TRICYCLICS UR QL SCN: NEGATIVE
TROPONIN I SERPL-MCNC: 0 NG/ML (ref 0–0.07)
UROBILINOGEN UR QL STRIP: ABNORMAL
WBC NRBC COR # BLD: 4.4 10*3/MM3 (ref 3.5–10.8)
WBC UR QL AUTO: ABNORMAL /HPF
WHOLE BLOOD HOLD SPECIMEN: NORMAL
WHOLE BLOOD HOLD SPECIMEN: NORMAL

## 2018-07-30 PROCEDURE — 83690 ASSAY OF LIPASE: CPT

## 2018-07-30 PROCEDURE — 93005 ELECTROCARDIOGRAM TRACING: CPT

## 2018-07-30 PROCEDURE — 81025 URINE PREGNANCY TEST: CPT | Performed by: EMERGENCY MEDICINE

## 2018-07-30 PROCEDURE — 80053 COMPREHEN METABOLIC PANEL: CPT

## 2018-07-30 PROCEDURE — 80307 DRUG TEST PRSMV CHEM ANLYZR: CPT | Performed by: NURSE PRACTITIONER

## 2018-07-30 PROCEDURE — 84484 ASSAY OF TROPONIN QUANT: CPT

## 2018-07-30 PROCEDURE — 80074 ACUTE HEPATITIS PANEL: CPT | Performed by: NURSE PRACTITIONER

## 2018-07-30 PROCEDURE — 76705 ECHO EXAM OF ABDOMEN: CPT

## 2018-07-30 PROCEDURE — 25010000002 THIAMINE PER 100 MG: Performed by: EMERGENCY MEDICINE

## 2018-07-30 PROCEDURE — 71045 X-RAY EXAM CHEST 1 VIEW: CPT

## 2018-07-30 PROCEDURE — 80306 DRUG TEST PRSMV INSTRMNT: CPT | Performed by: NURSE PRACTITIONER

## 2018-07-30 PROCEDURE — 81001 URINALYSIS AUTO W/SCOPE: CPT | Performed by: EMERGENCY MEDICINE

## 2018-07-30 PROCEDURE — 93005 ELECTROCARDIOGRAM TRACING: CPT | Performed by: EMERGENCY MEDICINE

## 2018-07-30 PROCEDURE — 36415 COLL VENOUS BLD VENIPUNCTURE: CPT

## 2018-07-30 PROCEDURE — 85025 COMPLETE CBC W/AUTO DIFF WBC: CPT

## 2018-07-30 PROCEDURE — 99285 EMERGENCY DEPT VISIT HI MDM: CPT

## 2018-07-30 PROCEDURE — 25010000002 MAGNESIUM SULFATE PER 500 MG OF MAGNESIUM: Performed by: EMERGENCY MEDICINE

## 2018-07-30 PROCEDURE — 99223 1ST HOSP IP/OBS HIGH 75: CPT | Performed by: INTERNAL MEDICINE

## 2018-07-30 RX ORDER — LORAZEPAM 1 MG/1
1 TABLET ORAL
Status: DISCONTINUED | OUTPATIENT
Start: 2018-07-30 | End: 2018-08-02 | Stop reason: HOSPADM

## 2018-07-30 RX ORDER — GABAPENTIN 300 MG/1
900 CAPSULE ORAL NIGHTLY
Status: ON HOLD | COMMUNITY
End: 2019-03-29

## 2018-07-30 RX ORDER — LORAZEPAM 2 MG/ML
2 INJECTION INTRAMUSCULAR
Status: DISCONTINUED | OUTPATIENT
Start: 2018-07-30 | End: 2018-08-02 | Stop reason: HOSPADM

## 2018-07-30 RX ORDER — THIAMINE MONONITRATE (VIT B1) 100 MG
100 TABLET ORAL DAILY
Status: COMPLETED | OUTPATIENT
Start: 2018-07-31 | End: 2018-08-02

## 2018-07-30 RX ORDER — FOLIC ACID 1 MG/1
1 TABLET ORAL DAILY
Status: COMPLETED | OUTPATIENT
Start: 2018-07-31 | End: 2018-08-02

## 2018-07-30 RX ORDER — SODIUM CHLORIDE 9 MG/ML
100 INJECTION, SOLUTION INTRAVENOUS CONTINUOUS
Status: ACTIVE | OUTPATIENT
Start: 2018-07-31 | End: 2018-07-31

## 2018-07-30 RX ORDER — ONDANSETRON 2 MG/ML
4 INJECTION INTRAMUSCULAR; INTRAVENOUS EVERY 6 HOURS PRN
Status: DISCONTINUED | OUTPATIENT
Start: 2018-07-30 | End: 2018-08-02 | Stop reason: HOSPADM

## 2018-07-30 RX ORDER — IBUPROFEN 200 MG
200 TABLET ORAL AS NEEDED
Status: ON HOLD | COMMUNITY
End: 2019-03-29

## 2018-07-30 RX ORDER — GABAPENTIN 300 MG/1
600 CAPSULE ORAL EVERY 8 HOURS SCHEDULED
Status: DISCONTINUED | OUTPATIENT
Start: 2018-07-31 | End: 2018-08-02 | Stop reason: HOSPADM

## 2018-07-30 RX ORDER — DIPHENOXYLATE HYDROCHLORIDE AND ATROPINE SULFATE 2.5; .025 MG/1; MG/1
1 TABLET ORAL DAILY
Status: COMPLETED | OUTPATIENT
Start: 2018-07-31 | End: 2018-08-02

## 2018-07-30 RX ORDER — ELECTROLYTES/DEXTROSE
1 SOLUTION, ORAL ORAL
Status: ON HOLD | COMMUNITY
End: 2019-03-29

## 2018-07-30 RX ORDER — GABAPENTIN 300 MG/1
600 CAPSULE ORAL 2 TIMES DAILY
Status: ON HOLD | COMMUNITY
End: 2019-03-29

## 2018-07-30 RX ORDER — ONDANSETRON 4 MG/1
4 TABLET, FILM COATED ORAL EVERY 6 HOURS PRN
Status: DISCONTINUED | OUTPATIENT
Start: 2018-07-30 | End: 2018-08-02 | Stop reason: HOSPADM

## 2018-07-30 RX ORDER — LORAZEPAM 2 MG/ML
1 INJECTION INTRAMUSCULAR
Status: DISCONTINUED | OUTPATIENT
Start: 2018-07-30 | End: 2018-08-02 | Stop reason: HOSPADM

## 2018-07-30 RX ORDER — SODIUM CHLORIDE 0.9 % (FLUSH) 0.9 %
1-10 SYRINGE (ML) INJECTION AS NEEDED
Status: DISCONTINUED | OUTPATIENT
Start: 2018-07-30 | End: 2018-08-02 | Stop reason: HOSPADM

## 2018-07-30 RX ORDER — ALBUTEROL SULFATE 2.5 MG/3ML
2.5 SOLUTION RESPIRATORY (INHALATION) EVERY 6 HOURS PRN
Status: DISCONTINUED | OUTPATIENT
Start: 2018-07-30 | End: 2018-08-02 | Stop reason: HOSPADM

## 2018-07-30 RX ORDER — SODIUM CHLORIDE 0.9 % (FLUSH) 0.9 %
10 SYRINGE (ML) INJECTION AS NEEDED
Status: DISCONTINUED | OUTPATIENT
Start: 2018-07-30 | End: 2018-08-02 | Stop reason: HOSPADM

## 2018-07-30 RX ORDER — LORAZEPAM 1 MG/1
2 TABLET ORAL
Status: DISCONTINUED | OUTPATIENT
Start: 2018-07-30 | End: 2018-08-02 | Stop reason: HOSPADM

## 2018-07-30 RX ORDER — FOLIC ACID 0.8 MG
1 TABLET ORAL DAILY
Status: ON HOLD | COMMUNITY
End: 2019-03-29

## 2018-07-30 RX ADMIN — FOLIC ACID 1000 ML/HR: 5 INJECTION, SOLUTION INTRAMUSCULAR; INTRAVENOUS; SUBCUTANEOUS at 19:40

## 2018-07-30 RX ADMIN — SODIUM CHLORIDE 1000 ML: 9 INJECTION, SOLUTION INTRAVENOUS at 17:20

## 2018-07-31 ENCOUNTER — APPOINTMENT (OUTPATIENT)
Dept: MRI IMAGING | Facility: HOSPITAL | Age: 23
End: 2018-07-31
Attending: SURGERY

## 2018-07-31 PROBLEM — N30.00 ACUTE CYSTITIS: Status: ACTIVE | Noted: 2018-07-31

## 2018-07-31 PROBLEM — D69.6 THROMBOCYTOPENIA (HCC): Status: ACTIVE | Noted: 2018-07-31

## 2018-07-31 PROBLEM — E80.6 HYPERBILIRUBINEMIA: Status: ACTIVE | Noted: 2018-07-31

## 2018-07-31 LAB
ANION GAP SERPL CALCULATED.3IONS-SCNC: 9 MMOL/L (ref 3–11)
BUN BLD-MCNC: <5 MG/DL (ref 9–23)
BUN/CREAT SERPL: ABNORMAL (ref 7–25)
CALCIUM SPEC-SCNC: 7.9 MG/DL (ref 8.7–10.4)
CHLORIDE SERPL-SCNC: 107 MMOL/L (ref 99–109)
CO2 SERPL-SCNC: 24 MMOL/L (ref 20–31)
CREAT BLD-MCNC: 0.38 MG/DL (ref 0.6–1.3)
DEPRECATED RDW RBC AUTO: 49.8 FL (ref 37–54)
ERYTHROCYTE [DISTWIDTH] IN BLOOD BY AUTOMATED COUNT: 13.3 % (ref 11.3–14.5)
GFR SERPL CREATININE-BSD FRML MDRD: >150 ML/MIN/1.73
GLUCOSE BLD-MCNC: 86 MG/DL (ref 70–100)
HCT VFR BLD AUTO: 32.9 % (ref 34.5–44)
HGB BLD-MCNC: 10.9 G/DL (ref 11.5–15.5)
INR PPP: 1.7 (ref 0.91–1.09)
MCH RBC QN AUTO: 33.5 PG (ref 27–31)
MCHC RBC AUTO-ENTMCNC: 33.1 G/DL (ref 32–36)
MCV RBC AUTO: 101.2 FL (ref 80–99)
PLATELET # BLD AUTO: 34 10*3/MM3 (ref 150–450)
PMV BLD AUTO: 11.6 FL (ref 6–12)
POTASSIUM BLD-SCNC: 3.1 MMOL/L (ref 3.5–5.5)
POTASSIUM BLD-SCNC: 4 MMOL/L (ref 3.5–5.5)
PROTHROMBIN TIME: 17.8 SECONDS (ref 9.6–11.5)
RBC # BLD AUTO: 3.25 10*6/MM3 (ref 3.89–5.14)
SODIUM BLD-SCNC: 140 MMOL/L (ref 132–146)
WBC NRBC COR # BLD: 3.65 10*3/MM3 (ref 3.5–10.8)

## 2018-07-31 PROCEDURE — 25010000002 ONDANSETRON PER 1 MG: Performed by: NURSE PRACTITIONER

## 2018-07-31 PROCEDURE — 85610 PROTHROMBIN TIME: CPT | Performed by: NURSE PRACTITIONER

## 2018-07-31 PROCEDURE — 99254 IP/OBS CNSLTJ NEW/EST MOD 60: CPT | Performed by: PHYSICIAN ASSISTANT

## 2018-07-31 PROCEDURE — 25010000002 VITAMIN K1 PER 1 MG: Performed by: PHYSICIAN ASSISTANT

## 2018-07-31 PROCEDURE — 80076 HEPATIC FUNCTION PANEL: CPT | Performed by: PHYSICIAN ASSISTANT

## 2018-07-31 PROCEDURE — 80048 BASIC METABOLIC PNL TOTAL CA: CPT | Performed by: NURSE PRACTITIONER

## 2018-07-31 PROCEDURE — 25010000002 LORAZEPAM PER 2 MG: Performed by: FAMILY MEDICINE

## 2018-07-31 PROCEDURE — 74181 MRI ABDOMEN W/O CONTRAST: CPT

## 2018-07-31 PROCEDURE — 99233 SBSQ HOSP IP/OBS HIGH 50: CPT | Performed by: FAMILY MEDICINE

## 2018-07-31 PROCEDURE — 84132 ASSAY OF SERUM POTASSIUM: CPT | Performed by: FAMILY MEDICINE

## 2018-07-31 PROCEDURE — 85027 COMPLETE CBC AUTOMATED: CPT | Performed by: NURSE PRACTITIONER

## 2018-07-31 PROCEDURE — 63710000001 PREDNISOLONE PER 5 MG: Performed by: PHYSICIAN ASSISTANT

## 2018-07-31 RX ORDER — MAGNESIUM SULFATE HEPTAHYDRATE 40 MG/ML
2 INJECTION, SOLUTION INTRAVENOUS AS NEEDED
Status: DISCONTINUED | OUTPATIENT
Start: 2018-07-31 | End: 2018-08-02 | Stop reason: HOSPADM

## 2018-07-31 RX ORDER — POTASSIUM CHLORIDE 750 MG/1
40 CAPSULE, EXTENDED RELEASE ORAL AS NEEDED
Status: DISCONTINUED | OUTPATIENT
Start: 2018-07-31 | End: 2018-08-02 | Stop reason: HOSPADM

## 2018-07-31 RX ORDER — LORAZEPAM 2 MG/ML
1 INJECTION INTRAMUSCULAR ONCE
Status: COMPLETED | OUTPATIENT
Start: 2018-07-31 | End: 2018-07-31

## 2018-07-31 RX ORDER — ZINC SULFATE 50(220)MG
220 CAPSULE ORAL 2 TIMES DAILY
Status: DISCONTINUED | OUTPATIENT
Start: 2018-07-31 | End: 2018-08-02 | Stop reason: HOSPADM

## 2018-07-31 RX ORDER — POTASSIUM CHLORIDE 7.45 MG/ML
10 INJECTION INTRAVENOUS
Status: DISCONTINUED | OUTPATIENT
Start: 2018-07-31 | End: 2018-08-02 | Stop reason: HOSPADM

## 2018-07-31 RX ORDER — MAGNESIUM SULFATE HEPTAHYDRATE 40 MG/ML
4 INJECTION, SOLUTION INTRAVENOUS AS NEEDED
Status: DISCONTINUED | OUTPATIENT
Start: 2018-07-31 | End: 2018-08-02 | Stop reason: HOSPADM

## 2018-07-31 RX ORDER — PREDNISOLONE SODIUM PHOSPHATE 15 MG/5ML
40 SOLUTION ORAL DAILY
Status: DISCONTINUED | OUTPATIENT
Start: 2018-07-31 | End: 2018-08-02 | Stop reason: HOSPADM

## 2018-07-31 RX ORDER — POTASSIUM CHLORIDE 1.5 G/1.77G
40 POWDER, FOR SOLUTION ORAL AS NEEDED
Status: DISCONTINUED | OUTPATIENT
Start: 2018-07-31 | End: 2018-08-02 | Stop reason: HOSPADM

## 2018-07-31 RX ORDER — IBUPROFEN 400 MG/1
400 TABLET ORAL EVERY 6 HOURS PRN
Status: DISCONTINUED | OUTPATIENT
Start: 2018-07-31 | End: 2018-08-02 | Stop reason: HOSPADM

## 2018-07-31 RX ORDER — SODIUM CHLORIDE 9 MG/ML
100 INJECTION, SOLUTION INTRAVENOUS CONTINUOUS
Status: ACTIVE | OUTPATIENT
Start: 2018-07-31 | End: 2018-08-01

## 2018-07-31 RX ORDER — PENTOXIFYLLINE 400 MG/1
400 TABLET, EXTENDED RELEASE ORAL
Status: DISCONTINUED | OUTPATIENT
Start: 2018-07-31 | End: 2018-08-02 | Stop reason: HOSPADM

## 2018-07-31 RX ADMIN — PHYTONADIONE 10 MG: 10 INJECTION, EMULSION INTRAMUSCULAR; INTRAVENOUS; SUBCUTANEOUS at 17:00

## 2018-07-31 RX ADMIN — LORAZEPAM 1 MG: 2 INJECTION INTRAMUSCULAR; INTRAVENOUS at 13:41

## 2018-07-31 RX ADMIN — POTASSIUM CHLORIDE 40 MEQ: 750 CAPSULE, EXTENDED RELEASE ORAL at 09:37

## 2018-07-31 RX ADMIN — Medication 100 MG: at 09:37

## 2018-07-31 RX ADMIN — FOLIC ACID 1 MG: 1 TABLET ORAL at 09:37

## 2018-07-31 RX ADMIN — ZINC SULFATE CAP 220 MG (50 MG ELEMENTAL ZN) 220 MG: 220 (50 ZN) CAP at 13:40

## 2018-07-31 RX ADMIN — GABAPENTIN 600 MG: 300 CAPSULE ORAL at 06:41

## 2018-07-31 RX ADMIN — SODIUM CHLORIDE 100 ML/HR: 9 INJECTION, SOLUTION INTRAVENOUS at 00:10

## 2018-07-31 RX ADMIN — ONDANSETRON 4 MG: 2 INJECTION INTRAMUSCULAR; INTRAVENOUS at 15:21

## 2018-07-31 RX ADMIN — GABAPENTIN 600 MG: 300 CAPSULE ORAL at 22:30

## 2018-07-31 RX ADMIN — Medication 1 TABLET: at 09:37

## 2018-07-31 RX ADMIN — SODIUM CHLORIDE 100 ML/HR: 9 INJECTION, SOLUTION INTRAVENOUS at 16:38

## 2018-07-31 RX ADMIN — ZINC SULFATE CAP 220 MG (50 MG ELEMENTAL ZN) 220 MG: 220 (50 ZN) CAP at 22:30

## 2018-07-31 RX ADMIN — POTASSIUM CHLORIDE 40 MEQ: 750 CAPSULE, EXTENDED RELEASE ORAL at 17:11

## 2018-07-31 RX ADMIN — PENTOXIFYLLINE 400 MG: 400 TABLET, FILM COATED, EXTENDED RELEASE ORAL at 17:12

## 2018-07-31 RX ADMIN — PREDNISOLONE SODIUM PHOSPHATE 40 MG: 15 SOLUTION ORAL at 17:12

## 2018-07-31 RX ADMIN — GABAPENTIN 600 MG: 300 CAPSULE ORAL at 13:41

## 2018-07-31 RX ADMIN — PENTOXIFYLLINE 400 MG: 400 TABLET, FILM COATED, EXTENDED RELEASE ORAL at 13:40

## 2018-07-31 RX ADMIN — POTASSIUM CHLORIDE 40 MEQ: 750 CAPSULE, EXTENDED RELEASE ORAL at 13:40

## 2018-08-01 LAB
ALBUMIN SERPL-MCNC: 2.98 G/DL (ref 3.2–4.8)
ALBUMIN SERPL-MCNC: 3.06 G/DL (ref 3.2–4.8)
ALBUMIN/GLOB SERPL: 0.9 G/DL (ref 1.5–2.5)
ALP SERPL-CCNC: 150 U/L (ref 25–100)
ALP SERPL-CCNC: 155 U/L (ref 25–100)
ALT SERPL W P-5'-P-CCNC: 30 U/L (ref 7–40)
ALT SERPL W P-5'-P-CCNC: 33 U/L (ref 7–40)
ANION GAP SERPL CALCULATED.3IONS-SCNC: 8 MMOL/L (ref 3–11)
AST SERPL-CCNC: 121 U/L (ref 0–33)
AST SERPL-CCNC: 150 U/L (ref 0–33)
BILIRUB CONJ SERPL-MCNC: 2.3 MG/DL (ref 0–0.2)
BILIRUB INDIRECT SERPL-MCNC: 2.9 MG/DL (ref 0.1–1.1)
BILIRUB SERPL-MCNC: 4.3 MG/DL (ref 0.3–1.2)
BILIRUB SERPL-MCNC: 5.2 MG/DL (ref 0.3–1.2)
BUN BLD-MCNC: <5 MG/DL (ref 9–23)
BUN/CREAT SERPL: ABNORMAL (ref 7–25)
CALCIUM SPEC-SCNC: 8.2 MG/DL (ref 8.7–10.4)
CHLORIDE SERPL-SCNC: 108 MMOL/L (ref 99–109)
CO2 SERPL-SCNC: 22 MMOL/L (ref 20–31)
CREAT BLD-MCNC: 0.42 MG/DL (ref 0.6–1.3)
GFR SERPL CREATININE-BSD FRML MDRD: >150 ML/MIN/1.73
GLOBULIN UR ELPH-MCNC: 3.4 GM/DL
GLUCOSE BLD-MCNC: 158 MG/DL (ref 70–100)
INR PPP: 1.68 (ref 0.91–1.09)
POTASSIUM BLD-SCNC: 4.3 MMOL/L (ref 3.5–5.5)
PROT SERPL-MCNC: 6.4 G/DL (ref 5.7–8.2)
PROT SERPL-MCNC: 6.9 G/DL (ref 5.7–8.2)
PROTHROMBIN TIME: 17.6 SECONDS (ref 9.6–11.5)
SODIUM BLD-SCNC: 138 MMOL/L (ref 132–146)

## 2018-08-01 PROCEDURE — 80053 COMPREHEN METABOLIC PANEL: CPT | Performed by: PHYSICIAN ASSISTANT

## 2018-08-01 PROCEDURE — 99232 SBSQ HOSP IP/OBS MODERATE 35: CPT | Performed by: PHYSICIAN ASSISTANT

## 2018-08-01 PROCEDURE — 63710000001 PREDNISOLONE PER 5 MG: Performed by: PHYSICIAN ASSISTANT

## 2018-08-01 PROCEDURE — 99233 SBSQ HOSP IP/OBS HIGH 50: CPT | Performed by: HOSPITALIST

## 2018-08-01 PROCEDURE — 85610 PROTHROMBIN TIME: CPT | Performed by: PHYSICIAN ASSISTANT

## 2018-08-01 RX ADMIN — PREDNISOLONE SODIUM PHOSPHATE 40 MG: 15 SOLUTION ORAL at 08:15

## 2018-08-01 RX ADMIN — PENTOXIFYLLINE 400 MG: 400 TABLET, FILM COATED, EXTENDED RELEASE ORAL at 08:15

## 2018-08-01 RX ADMIN — Medication 100 MG: at 08:15

## 2018-08-01 RX ADMIN — PENTOXIFYLLINE 400 MG: 400 TABLET, FILM COATED, EXTENDED RELEASE ORAL at 12:25

## 2018-08-01 RX ADMIN — GABAPENTIN 600 MG: 300 CAPSULE ORAL at 20:37

## 2018-08-01 RX ADMIN — FOLIC ACID 1 MG: 1 TABLET ORAL at 08:15

## 2018-08-01 RX ADMIN — PENTOXIFYLLINE 400 MG: 400 TABLET, FILM COATED, EXTENDED RELEASE ORAL at 17:11

## 2018-08-01 RX ADMIN — ZINC SULFATE CAP 220 MG (50 MG ELEMENTAL ZN) 220 MG: 220 (50 ZN) CAP at 08:14

## 2018-08-01 RX ADMIN — Medication 1 TABLET: at 08:14

## 2018-08-01 RX ADMIN — GABAPENTIN 600 MG: 300 CAPSULE ORAL at 14:50

## 2018-08-01 RX ADMIN — ZINC SULFATE CAP 220 MG (50 MG ELEMENTAL ZN) 220 MG: 220 (50 ZN) CAP at 20:37

## 2018-08-01 RX ADMIN — GABAPENTIN 600 MG: 300 CAPSULE ORAL at 05:44

## 2018-08-01 NOTE — PROGRESS NOTES
"GI Daily Progress Note  Subjective:    Chief Complaint:  Acute alcoholic hepatitis     Feeling better today.  RUQ pain is improving.  Ate 90% of breakfast and 60% of lunch.       Objective:    /80 (BP Location: Right arm, Patient Position: Lying)   Pulse 100   Temp 98.4 °F (36.9 °C) (Oral)   Resp 18   Ht 177.8 cm (70\")   Wt 57.9 kg (127 lb 9.6 oz)   SpO2 100%   BMI 18.31 kg/m²     Physical Exam   Constitutional: She is oriented to person, place, and time. No distress.   Thin.  Ill appearing   HENT:   Head: Normocephalic and atraumatic.   Eyes: Scleral icterus is present.   Cardiovascular: Normal rate and regular rhythm.    Pulmonary/Chest: Effort normal. No respiratory distress.   Abdominal: Soft. Bowel sounds are normal. She exhibits no distension.   Mild tenderness to palpation of the right upper quadrant.  No guarding    Musculoskeletal: She exhibits no edema.   Neurological: She is alert and oriented to person, place, and time.   Skin:   Jaundice   Psychiatric: Her behavior is normal.   Nursing note and vitals reviewed.      Lab  Lab Results   Component Value Date    WBC 3.65 07/31/2018    HGB 10.9 (L) 07/31/2018    HGB 13.1 07/30/2018    HGB 12.6 10/31/2017    .2 (H) 07/31/2018    PLT 34 (L) 07/31/2018    INR 1.68 (H) 08/01/2018    INR 1.70 (H) 07/31/2018    INR 1.22 10/30/2017       Lab Results   Component Value Date    GLUCOSE 158 (H) 08/01/2018    BUN <5 (L) 08/01/2018    CREATININE 0.42 (L) 08/01/2018    EGFRIFNONA >150 08/01/2018    BCR  08/01/2018      Comment:      Unable to calculate Bun/Crea Ratio.    CO2 22.0 08/01/2018    CALCIUM 8.2 (L) 08/01/2018    ALBUMIN 2.98 (L) 08/01/2018    ALKPHOS 155 (H) 08/01/2018    BILITOT 4.3 (H) 08/01/2018    BILIDIR 2.3 (H) 07/31/2018    ALT 30 08/01/2018     (H) 08/01/2018       Assessment:    1. Acute alcoholic hepatitis, severe, discriminant function is 34.9   2. Abdominal pain, secondary to above  3. Gallbladder sludge, seen on " ultrasound  4. Macrocytic anemia, secondary to alcohol use and bone marrow suppresion  5. Thrombocytopenia   6. Coagulopathy, received IV Vitamin K+ 10mg yesterday    Plan:    >>> LFTs slightly improved overnight.   INR is stable  >>> Nutrition remains critical and she has increased po intake overnight.   Again encouraged protein based snacks throughout the day and at bedtime.   Will have a dietitian see.  Will hold off on Dobhoff as po intake is adequate currently  >>> Continue prednisolone.  Calculate Lille score on 8/6  >>> Continue trental and Zinc    Encouraged patient to increase ambulation      ADRIEL Snow  08/01/18  1:03 PM

## 2018-08-01 NOTE — CONSULTS
Nutrition Services    Patient Name:  Melba Terry  YOB: 1995  MRN: [id number]  Admit Date:  7/30/2018    Nutrition assessment completed yesterday. Discussed snack and supplement preferences with patient. Supplement and snack orders placed this morning. Patient will only drink carnation instant breakfast rather than Boost/Ensure that is higher in protein.    Electronically signed by:  Shell Rhodes RD  08/01/18 2:16 PM

## 2018-08-01 NOTE — PLAN OF CARE
Problem: Patient Care Overview  Goal: Plan of Care Review  Outcome: Ongoing (interventions implemented as appropriate)   08/01/18 0459   Coping/Psychosocial   Plan of Care Reviewed With patient   Coping/Psychosocial   Patient Agreement with Plan of Care agrees   Plan of Care Review   Progress improving   OTHER   Outcome Summary VSS, denies pain or discomfort, increase anxiety r/t current illness and situation.        Problem: Anxiety (Adult)  Goal: Identify Related Risk Factors and Signs and Symptoms  Outcome: Ongoing (interventions implemented as appropriate)   07/31/18 0105 08/01/18 0459   Anxiety (Adult)   Related Risk Factors (Anxiety) environment change;knowledge deficit;procedure/treatment --    Signs and Symptoms (Anxiety) --  agitation;voice quivering/tremor/pitch changes

## 2018-08-01 NOTE — PROGRESS NOTES
[facility] Medicine Services  PROGRESS NOTE    Patient Name: Melba Terry  : 1995  MRN: [id number]    Date of Admission: 2018  Length of Stay: 2  Primary Care Physician: Carlos Enrique Hook MD    Subjective   Subjective     CC:  N/V    HPI:  Sitting up in bed, eating breakfast. Mom at bedside. Denies f/c. No n/v. No dyspnea. No chest pain. Wants better breakfast tray.    Review of Systems    Otherwise ROS is negative except as mentioned in the HPI.    Objective   Objective     Vital Signs:   Temp:  [98.2 °F (36.8 °C)-99.3 °F (37.4 °C)] 98.2 °F (36.8 °C)  Heart Rate:  [] 93  Resp:  [18] 18  BP: (102-118)/(65-78) 102/65        Physical Exam:  NAD, alert and oriented  OP clear, MMM  PERRL  Neck supple  RRR  CTAB  +BS, ND, NT  DAWN  No c/c/e  No rashes    Results Reviewed:  I have personally reviewed current lab, radiology, and data and agree.      Results from last 7 days  Lab Units 18  0440 18  1519   WBC 10*3/mm3 3.65 4.40   HEMOGLOBIN g/dL 10.9* 13.1   HEMATOCRIT % 32.9* 38.2   PLATELETS 10*3/mm3 34* 51*   INR  1.70*  --        Results from last 7 days  Lab Units 18  2330 18  2033 18  0440 18  1519   SODIUM mmol/L  --   --  140 142   POTASSIUM mmol/L  --  4.0 3.1* 3.7   CHLORIDE mmol/L  --   --  107 102   CO2 mmol/L  --   --  24.0 27.0   BUN mg/dL  --   --  <5* <5*   CREATININE mg/dL  --   --  0.38* 0.52*   GLUCOSE mg/dL  --   --  86 97   CALCIUM mg/dL  --   --  7.9* 9.2   ALT (SGPT) U/L 33  --   --  52*   AST (SGOT) U/L 150*  --   --  249*     Estimated Creatinine Clearance: 208.7 mL/min (A) (by C-G formula based on SCr of 0.38 mg/dL (L)).  No results found for: BNP    Microbiology Results Abnormal     None          Imaging Results (last 24 hours)     Procedure Component Value Units Date/Time    MRI abdomen wo contrast mrcp [[id number]] Resulted:  18 1404     Updated:  18 1604             I have reviewed the  medications.    Assessment/Plan   Assessment / Plan     Hospital Problem List     * (Principal)Non-intractable vomiting with nausea    Abdominal pain    Acute alcoholic hepatitis    Hyperbilirubinemia    Acute cystitis    Thrombocytopenia (CMS/HCC)             Brief Hospital Course to date:  Melba Terry is a 23 y.o. female here with acute alcoholic hepatitis      Assessment & Plan:  Acute alcoholic hepatitis  --steroids, supportive care  --monitor LFTs/INR  --s/p Vit K  Gallbladder sludge  --nothing surgical at this time  ETOH abuse  --alcohol withdrawal protocol  --counseled      DVT Prophylaxis:  SCDS      CODE STATUS:   Code Status and Medical Interventions:   Ordered at: 07/30/18 1229     Level Of Support Discussed With:    Patient     Code Status:    CPR     Medical Interventions (Level of Support Prior to Arrest):    Full       Disposition: I expect the patient to be discharged TBD.      Electronically signed by Derrick Martin MD, 08/01/18, 7:50 AM.

## 2018-08-01 NOTE — PROGRESS NOTES
"Continued Stay Note  [facility]     Patient Name: Melba Terry  MRN: [id number]  Today's Date: 8/1/2018    Admit Date: 7/30/2018          Discharge Plan     Row Name 08/01/18 1645       Plan    Plan SW follow-up    Patient/Family in Agreement with Plan yes    Plan Comments SW met with pt. at bedside; no family was present.  This SW met with pt. during pt's last hospital admission.  Pt. stated after her last discharge she went to an inpatient rehab facility in [place], [place] called [facility].  Pt. said she was there for one month before she was discharged home.  Pt. said she went there for \"all the wrong reasons\" and explained that that was what her family wanted her to do.  Pt. stated she is tired of being sick and wants to get help.  Pt. stated she wants to return to [facility] and has left a message with her therapist from the facility.  Pt. declined needing assistance from SW at this time.  Pt. has requested not to have family visitors at this time.              Discharge Codes    No documentation.       Expected Discharge Date and Time     Expected Discharge Date Expected Discharge Time    Aug 3, 2018             NI Martínez    "

## 2018-08-02 VITALS
RESPIRATION RATE: 14 BRPM | OXYGEN SATURATION: 100 % | BODY MASS INDEX: 18.27 KG/M2 | SYSTOLIC BLOOD PRESSURE: 111 MMHG | WEIGHT: 127.6 LBS | DIASTOLIC BLOOD PRESSURE: 67 MMHG | TEMPERATURE: 98.3 F | HEART RATE: 88 BPM | HEIGHT: 70 IN

## 2018-08-02 LAB
ALBUMIN SERPL-MCNC: 2.97 G/DL (ref 3.2–4.8)
ALBUMIN/GLOB SERPL: 0.9 G/DL (ref 1.5–2.5)
ALP SERPL-CCNC: 177 U/L (ref 25–100)
ALT SERPL W P-5'-P-CCNC: 28 U/L (ref 7–40)
ANION GAP SERPL CALCULATED.3IONS-SCNC: 8 MMOL/L (ref 3–11)
AST SERPL-CCNC: 107 U/L (ref 0–33)
BILIRUB SERPL-MCNC: 3.2 MG/DL (ref 0.3–1.2)
BUN BLD-MCNC: 5 MG/DL (ref 9–23)
BUN/CREAT SERPL: 12.8 (ref 7–25)
CALCIUM SPEC-SCNC: 7.8 MG/DL (ref 8.7–10.4)
CHLORIDE SERPL-SCNC: 108 MMOL/L (ref 99–109)
CO2 SERPL-SCNC: 23 MMOL/L (ref 20–31)
CREAT BLD-MCNC: 0.39 MG/DL (ref 0.6–1.3)
GFR SERPL CREATININE-BSD FRML MDRD: >150 ML/MIN/1.73
GLOBULIN UR ELPH-MCNC: 3.4 GM/DL
GLUCOSE BLD-MCNC: 103 MG/DL (ref 70–100)
POTASSIUM BLD-SCNC: 3.2 MMOL/L (ref 3.5–5.5)
PROT SERPL-MCNC: 6.4 G/DL (ref 5.7–8.2)
SODIUM BLD-SCNC: 139 MMOL/L (ref 132–146)

## 2018-08-02 PROCEDURE — 80053 COMPREHEN METABOLIC PANEL: CPT | Performed by: HOSPITALIST

## 2018-08-02 PROCEDURE — 63710000001 PREDNISOLONE PER 5 MG: Performed by: PHYSICIAN ASSISTANT

## 2018-08-02 PROCEDURE — 99239 HOSP IP/OBS DSCHRG MGMT >30: CPT | Performed by: HOSPITALIST

## 2018-08-02 RX ORDER — ZINC SULFATE 50(220)MG
220 CAPSULE ORAL 2 TIMES DAILY
Qty: 60 CAPSULE | Refills: 2 | Status: ON HOLD | OUTPATIENT
Start: 2018-08-02 | End: 2019-03-29

## 2018-08-02 RX ORDER — PENTOXIFYLLINE 400 MG/1
400 TABLET, EXTENDED RELEASE ORAL
Qty: 90 TABLET | Refills: 0 | Status: ON HOLD | OUTPATIENT
Start: 2018-08-02 | End: 2019-03-29

## 2018-08-02 RX ORDER — PREDNISOLONE SODIUM PHOSPHATE 15 MG/5ML
30 SOLUTION ORAL SEE ADMIN INSTRUCTIONS
Status: ON HOLD
Start: 2018-08-02 | End: 2019-03-29

## 2018-08-02 RX ADMIN — Medication 100 MG: at 08:30

## 2018-08-02 RX ADMIN — FOLIC ACID 1 MG: 1 TABLET ORAL at 08:30

## 2018-08-02 RX ADMIN — ZINC SULFATE CAP 220 MG (50 MG ELEMENTAL ZN) 220 MG: 220 (50 ZN) CAP at 08:30

## 2018-08-02 RX ADMIN — PREDNISOLONE SODIUM PHOSPHATE 40 MG: 15 SOLUTION ORAL at 08:29

## 2018-08-02 RX ADMIN — PENTOXIFYLLINE 400 MG: 400 TABLET, FILM COATED, EXTENDED RELEASE ORAL at 08:29

## 2018-08-02 RX ADMIN — Medication 1 TABLET: at 08:30

## 2018-08-02 RX ADMIN — PENTOXIFYLLINE 400 MG: 400 TABLET, FILM COATED, EXTENDED RELEASE ORAL at 11:46

## 2018-08-02 RX ADMIN — GABAPENTIN 600 MG: 300 CAPSULE ORAL at 06:15

## 2018-08-02 NOTE — PROGRESS NOTES
[facility] Medicine Services  PROGRESS NOTE    Patient Name: Melba Terry  : 1995  MRN: [id number]    Date of Admission: 2018  Length of Stay: 3  Primary Care Physician: Carlos Enrique Hook MD    Subjective   Subjective     CC:  N/V    HPI:  Sitting up in bed. Mom at bedside. No complaints, except B LE numbness. No f/c. No n/v. Tolerating PO.    Review of Systems    Otherwise ROS is negative except as mentioned in the HPI.    Objective   Objective     Vital Signs:   Temp:  [98.2 °F (36.8 °C)-98.4 °F (36.9 °C)] 98.3 °F (36.8 °C)  Heart Rate:  [] 88  Resp:  [14-18] 14  BP: (105-118)/(65-80) 111/67        Physical Exam:  NAD, alert and oriented  OP clear, MMM  PERRL  Neck supple  RRR  CTAB  +BS, ND, NT  DAWN  No c/c/e  No rashes  Unchanged from     Results Reviewed:  I have personally reviewed current lab, radiology, and data and agree.      Results from last 7 days  Lab Units 18  0837 18  0440 18  1519   WBC 10*3/mm3  --  3.65 4.40   HEMOGLOBIN g/dL  --  10.9* 13.1   HEMATOCRIT %  --  32.9* 38.2   PLATELETS 10*3/mm3  --  34* 51*   INR  1.68* 1.70*  --        Results from last 7 days  Lab Units 18  0658 18  0837 18  2330 18  2033 18  0440   SODIUM mmol/L 139 138  --   --  140   POTASSIUM mmol/L 3.2* 4.3  --  4.0 3.1*   CHLORIDE mmol/L 108 108  --   --  107   CO2 mmol/L 23.0 22.0  --   --  24.0   BUN mg/dL 5* <5*  --   --  <5*   CREATININE mg/dL 0.39* 0.42*  --   --  0.38*   GLUCOSE mg/dL 103* 158*  --   --  86   CALCIUM mg/dL 7.8* 8.2*  --   --  7.9*   ALT (SGPT) U/L 28 30 33  --   --    AST (SGOT) U/L 107* 121* 150*  --   --      Estimated Creatinine Clearance: 203.3 mL/min (A) (by C-G formula based on SCr of 0.39 mg/dL (L)).  No results found for: BNP    Microbiology Results Abnormal     None          Imaging Results (last 24 hours)     Procedure Component Value Units Date/Time    MRI abdomen wo contrast mrcp [[id number]]  Collected:  08/01/18 0949     Updated:  08/01/18 0952    Narrative:       EXAMINATION: MRI ABDOMEN WO CONTRAST MRCP-      INDICATION: Abnormal liver function tests (LFTs).     TECHNIQUE: Multiplanar MRI of the abdomen without intravenous contrast  following MRCP protocol.        COMPARISON: None.     FINDINGS: Lung bases are grossly clear. Liver demonstrates enlarged  appearance with moderate signal drop on opposed phase imaging diffusely  throughout the parenchyma consistent of hepatic steatosis. No focal  liver lesion is noted, however. Gallbladder is present and without  discrete filling defect to suggest cholelithiasis. Pericholecystic fluid  is present in the gallbladder fossa which may represent primary  gallbladder inflammation or secondary inflammation of the adjacent  hepatic parenchyma. No intrahepatic biliary dilatation with common bile  duct in normal appearance demonstrating smooth tapering at the level of  the ampulla without filling defect identified to suggest  choledocholithiasis. Pancreatic parenchyma is grossly within normal  limits. No main pancreatic ductal dilatation. No pancreas divisum is  identified. Spleen is enlarged to 16 cm in craniocaudal dimension.  Adrenals without distinct nodule. Kidneys without hydronephrosis or  hydroureter. Nonaneurysmal abdominal aorta. No bulky retroperitoneal  adenopathy. No aggressive bone marrow signal. Trace mesenteric edema and  trace ascites primarily in perihepatic and perisplenic location.           Impression:       1. Hepatosplenomegaly with trace ascites.  2. Diffuse moderate signal drop on opposed phase imaging consistent with  hepatic steatosis. No focal liver lesion is identified.  3. Pericholecystic fluid in the gallbladder fossa suggesting potential  gallbladder inflammation or secondary inflammation from adjacent hepatic  parenchymal process. No distinct cholelithiasis or choledocholithiasis  is observed.     D:  08/01/2018  E:  08/01/2018      This report was finalized on 8/1/2018 9:50 AM by Dr. Lazaro Lao.                I have reviewed the medications.    Assessment/Plan   Assessment / Plan     Hospital Problem List     * (Principal)Non-intractable vomiting with nausea    Abdominal pain    Acute alcoholic hepatitis    Hyperbilirubinemia    Acute cystitis    Thrombocytopenia (CMS/HCC)             Brief Hospital Course to date:  Melba Terry is a 23 y.o. female here with acute alcoholic hepatitis      Assessment & Plan:  Acute alcoholic hepatitis  --steroids, supportive care  --monitor LFTs/INR  --s/p Vit K  --mild improvement in AST/ALT/bili  Gallbladder sludge  --nothing surgical at this time  ETOH abuse  --alcohol withdrawal protocol  --counseled, to rehab at discharge  -  Will need hepatology follow up at discharge      DVT Prophylaxis:  SCDS      CODE STATUS:   Code Status and Medical Interventions:   Ordered at: 07/30/18 9764     Level Of Support Discussed With:    Patient     Code Status:    CPR     Medical Interventions (Level of Support Prior to Arrest):    Full       Disposition: I expect the patient to be discharged TBD.      Electronically signed by Derrick Martin MD, 08/02/18, 8:53 AM.

## 2018-08-02 NOTE — PAYOR COMM NOTE
"Billie Eng RN Utilization Review [phone]  Fax # [phone]  Ref # [id number]          Melba Ni (23 y.o. Female)     Date of Birth Social Security Number Address Home Phone MRN    1995  [address] [place] [address] [phone] [id number]    Confucianist Marital Status          [employer] Single       Admission Date Admission Type Admitting Provider Attending Provider Department, Room/Bed    18 Emergency Derrick Martin MD  [facility] 5G, S556/1    Discharge Date Discharge Disposition Discharge Destination        2018 Home or Self Care              Attending Provider:  (none)   Allergies:  No Known Allergies    Isolation:  None   Infection:  None   Code Status:  CPR    Ht:  177.8 cm (70\")   Wt:  57.9 kg (127 lb 9.6 oz)    Admission Cmt:  None   Principal Problem:  Non-intractable vomiting with nausea [R11.2]                 Active Insurance as of 2018     Primary Coverage     Payor Plan Insurance Group Employer/Plan Group    [employer]HEM BLUE [employer]OSS [employer] BLUE [employer]OSS [facility] [facility] PPO [id number]     Payor Plan Address Payor Plan Phone Number Effective From Effective To    [address] [phone] 2017     [facility] [id number]       Subscriber Name Subscriber Birth Date Member ID       NIKOLAS NI 1963 [id number]                 Emergency Contacts      (Rel.) Home Phone Work Phone Mobile Phone    Jessica Ni (Mother) -- -- [phone]    Nikolas Ni (Father) -- -- [phone]               Discharge Summary      Derrick Martin MD at 2018  9:47 AM              [facility] Medicine Services  DISCHARGE SUMMARY    Patient Name: Melba Ni  : 1995  MRN: [id number]    Date of Admission: 2018  Date of Discharge:  2018  Primary Care Physician: Carlos Enrique Hook MD    Consults     Date and Time Order Name Status Description    2018 2316 Inpatient General Surgery Consult Completed     2018 2316 " Inpatient Gastroenterology Consult Completed         Hospital Course     Presenting Problem:   Non-intractable vomiting with nausea, unspecified vomiting type [R11.2]  Abdominal pain [R10.9]  Acute alcoholic hepatitis [K70.10]  Acute alcoholic hepatitis [K70.10]    Active Hospital Problems    Diagnosis Date Noted   • **Non-intractable vomiting with nausea [R11.2] 07/30/2018   • Hyperbilirubinemia [E80.6] 07/31/2018   • Acute cystitis [N30.00] 07/31/2018   • Thrombocytopenia (CMS/HCC) [D69.6] 07/31/2018   • Abdominal pain [R10.9] 07/30/2018   • Acute alcoholic hepatitis [K70.10] 07/30/2018      Resolved Hospital Problems    Diagnosis Date Noted Date Resolved   No resolved problems to display.          Hospital Course:  Melba Terry is a 23 y.o. female here with acute alcoholic hepatitis. She has associated early cirrhotic changes as well. She was seen in consultation by GI and general surgery. Nothing surgical was required for her biliary sludge. She was started on steroids and trental for her hepatitis and her liver function tests and bilirubin levels continue to improve. She was given vitamin K, and her INR has not increased but has not returned to normal either. At this point, she is not experiencing any withdrawal symptoms, she is tolerating po, and her liver function tests are improving. She will be discharged on a steroid taper and continue trental. She will follow up with GI in 1 week with CMP/PT/INR.    She will seek inpatient rehab in [place] for alcohol and substance abuse. We have recommended outpatient rehab as a bridge between her inpatient therapy so that she can follow up with GI prior to rehabilitation. She may eventually need a hepatology specialist however, her liver function should continue improve should she continue to abstain from alcohol use.          Day of Discharge     HPI:   Tolerating PO. Anxious. No tremors. No hallucinations. No f/c. No n/v. No diarrhea.    Review of  Systems      Otherwise ROS is negative except as mentioned in the HPI.    Vital Signs:   Temp:  [98.2 °F (36.8 °C)-98.4 °F (36.9 °C)] 98.3 °F (36.8 °C)  Heart Rate:  [] 88  Resp:  [14-18] 14  BP: (105-118)/(65-80) 111/67     Physical Exam:  NAD, alert and oriented  OP clear, MMM  PERRL  Neck supple  RRR  CTAB  +BS, ND, NT  DAWN  No c/c/e  No rashes    Pertinent  and/or Most Recent Results       Results from last 7 days  Lab Units 08/02/18  0658 08/01/18  0837 07/31/18 2033 07/31/18 0440 07/30/18  1519   WBC 10*3/mm3  --   --   --  3.65 4.40   HEMOGLOBIN g/dL  --   --   --  10.9* 13.1   HEMATOCRIT %  --   --   --  32.9* 38.2   PLATELETS 10*3/mm3  --   --   --  34* 51*   SODIUM mmol/L 139 138  --  140 142   POTASSIUM mmol/L 3.2* 4.3 4.0 3.1* 3.7   CHLORIDE mmol/L 108 108  --  107 102   CO2 mmol/L 23.0 22.0  --  24.0 27.0   BUN mg/dL 5* <5*  --  <5* <5*   CREATININE mg/dL 0.39* 0.42*  --  0.38* 0.52*   GLUCOSE mg/dL 103* 158*  --  86 97   CALCIUM mg/dL 7.8* 8.2*  --  7.9* 9.2       Results from last 7 days  Lab Units 08/02/18  0658 08/01/18  0837 07/31/18 2330 07/31/18 0440 07/30/18  1519   BILIRUBIN mg/dL 3.2* 4.3* 5.2*  --  5.9*   ALK PHOS U/L 177* 155* 150*  --  202*   ALT (SGPT) U/L 28 30 33  --  52*   AST (SGOT) U/L 107* 121* 150*  --  249*   PROTIME Seconds  --  17.6*  --  17.8*  --    INR   --  1.68*  --  1.70*  --            Invalid input(s): TG, LDLCALC, LDLREALC      Brief Urine Lab Results  (Last result in the past 365 days)      Color   Clarity   Blood   Leuk Est   Nitrite   Protein   CREAT   Urine HCG        07/30/18 1712               Negative           Microbiology Results Abnormal     None          Imaging Results (all)     Procedure Component Value Units Date/Time    MRI abdomen wo contrast mrcp [[id number]] Collected:  08/01/18 0949     Updated:  08/01/18 0952    Narrative:       EXAMINATION: MRI ABDOMEN WO CONTRAST MRCP-      INDICATION: Abnormal liver function tests (LFTs).     TECHNIQUE:  Multiplanar MRI of the abdomen without intravenous contrast  following MRCP protocol.        COMPARISON: None.     FINDINGS: Lung bases are grossly clear. Liver demonstrates enlarged  appearance with moderate signal drop on opposed phase imaging diffusely  throughout the parenchyma consistent of hepatic steatosis. No focal  liver lesion is noted, however. Gallbladder is present and without  discrete filling defect to suggest cholelithiasis. Pericholecystic fluid  is present in the gallbladder fossa which may represent primary  gallbladder inflammation or secondary inflammation of the adjacent  hepatic parenchyma. No intrahepatic biliary dilatation with common bile  duct in normal appearance demonstrating smooth tapering at the level of  the ampulla without filling defect identified to suggest  choledocholithiasis. Pancreatic parenchyma is grossly within normal  limits. No main pancreatic ductal dilatation. No pancreas divisum is  identified. Spleen is enlarged to 16 cm in craniocaudal dimension.  Adrenals without distinct nodule. Kidneys without hydronephrosis or  hydroureter. Nonaneurysmal abdominal aorta. No bulky retroperitoneal  adenopathy. No aggressive bone marrow signal. Trace mesenteric edema and  trace ascites primarily in perihepatic and perisplenic location.           Impression:       1. Hepatosplenomegaly with trace ascites.  2. Diffuse moderate signal drop on opposed phase imaging consistent with  hepatic steatosis. No focal liver lesion is identified.  3. Pericholecystic fluid in the gallbladder fossa suggesting potential  gallbladder inflammation or secondary inflammation from adjacent hepatic  parenchymal process. No distinct cholelithiasis or choledocholithiasis  is observed.     D:  08/01/2018  E:  08/01/2018     This report was finalized on 8/1/2018 9:50 AM by Dr. Lazaro Lao.       US Liver [[id number]] Collected:  07/30/18 1734     Updated:  07/30/18 1844    Narrative:       EXAM:    US Abdomen  Limited, Right Upper Quadrant    EXAM DATE/TIME:    7/30/2018 5:34 PM    CLINICAL HISTORY:    23 years old, female; Abnormal findings; Abnormal lab test; Elevated liver   enzymes; Additional info: Elevated bilirubin/les.    TECHNIQUE:    Real-time ultrasound of the right upper quadrant with image documentation.    COMPARISON:    No relevant prior studies available.    FINDINGS:    Liver:  Increased echoes within the liver. At the superior course.  No   intrahepatic bile duct dilation.    Gallbladder:  Evidence suggesting possible small amount of pericholecystic   fluid. Prominent gallbladder wall thickness estimated 2.8 mm. Evidence of   layering sludge/debris in the gallbladder. Positive sonographic Berry sign   reported by the technician.    Common bile duct: Diameter measures 1 mm.  No stones.  No dilation.    Pancreas:  Unremarkable as visualized.    Right kidney: Unremarkable.  No stones.  No solid mass.  No hydronephrosis.         Impression:       Hepatic steatosis.    Gallbladder sludge. Upper limits of normal gallbladder wall size. Evidence   suggesting small amount of pericholecystic fluid possibly wall edema. Positive   sonographic Berry's sign reported by the technician.      THIS DOCUMENT HAS BEEN ELECTRONICALLY SIGNED BY MATTHEW KAISER MD    XR Chest 1 View [[id number]] Collected:  07/30/18 1608     Updated:  07/30/18 1649    Narrative:       EXAMINATION: XR CHEST 1 VW-07/30/2018:      INDICATION: Upper abdominal pain, triage protocol.      COMPARISON: NONE.     FINDINGS: Cardiac size within normal limits. Pulmonary vascularity  within normal limits. No focal opacification or consolidation. No  pneumothorax or pleural effusion. No acute osseous abnormality. No free  air underneath the hemidiaphragms on this portable erect view.           Impression:       No acute cardiopulmonary process. No free air underneath the  hemidiaphragms on this portable erect view.     D:  07/30/2018  E:  07/30/2018     This  report was finalized on 7/30/2018 4:47 PM by Dr. Lazaro Loa.                              Discharge Details        Discharge Medications      New Medications      Instructions Start Date   pentoxifylline 400 MG CR tablet  Commonly known as:  TRENtal   400 mg, Oral, 3 Times Daily With Meals      prednisoLONE 15 MG/5ML solution  Commonly known as:  ORAPRED   30 mg, Oral, See Admin Instructions, Taper as written      zinc sulfate 220 (50 Zn) MG capsule  Commonly known as:  ZINCATE   220 mg, Oral, 2 Times Daily         Continue These Medications      Instructions Start Date   albuterol 108 (90 Base) MCG/ACT inhaler  Commonly known as:  PROVENTIL HFA;VENTOLIN HFA   2 puffs, Inhalation, Every 4 Hours PRN      B-100 B-COMPLEX tablet   1 tablet, Oral, Daily      gabapentin 300 MG capsule  Commonly known as:  NEURONTIN   600 mg, Oral, 2 Times Daily, Pt taking 2 caps in the morning, 2 caps in the afternoon, and 3 caps at night.      gabapentin 300 MG capsule  Commonly known as:  NEURONTIN   900 mg, Oral, Nightly, Pt taking 2 caps in the morning, 2 caps in the afternoon, and 3 caps at night.       ibuprofen 200 MG tablet  Commonly known as:  ADVIL,MOTRIN   200 mg, Oral, As Needed      Magnesium 500 MG capsule   1 capsule, Oral, Daily      MULTIVITAMIN ADULT tablet   1 tablet, Oral               Discharge Disposition:  Home or Self Care    Discharge Diet:  Diet Instructions     Advance Diet As Tolerated             Discharge Activity:   Activity Instructions     Activity as Tolerated                   Code Status/Level of Support:  Code Status and Medical Interventions:   Ordered at: 07/30/18 3204     Level Of Support Discussed With:    Patient     Code Status:    CPR     Medical Interventions (Level of Support Prior to Arrest):    Full       No future appointments.    Additional Instructions for the Follow-ups that You Need to Schedule     Discharge Follow-up with Specified Provider: NINA Santiago, GI with CMP, and PT/INR  that day; 1 Week    As directed      To:  NINA Santiago, GI with CMP, and PT/INR that day    Follow Up:  1 Week               Time Spent on Discharge:  40 minutes    Electronically signed by Derrick Gomez MD, 08/02/18, 9:47 AM.        Electronically signed by Derrick Gomez MD at 8/2/2018  9:52 AM       Discharge Order     Start     Ordered    08/02/18 0928  Discharge patient  Once     Expected Discharge Date:  08/02/18    Discharge Disposition:  Home or Self Care    Physician of Record for Attribution - Please select from Treatment Team:  DERRICK GOMEZ [[id number]]    Review needed by CMO to determine Physician of Record:  No       Question Answer Comment   Physician of Record for Attribution - Please select from Treatment Team DERRICK GOMEZ    Review needed by CMO to determine Physician of Record No        08/02/18 0928

## 2018-08-02 NOTE — PROGRESS NOTES
Case Management Discharge Note    Final Note: Pt. will be discharged today.  SW spoke to pt. and both her parents regarding discharge planning.  Pt. will discharge home with her parents today.  Pt. plans to return to [facility] for inpatient rehabilitation after pt's gastroenterology appointment on 8/8/18.  SREEDHAR has contacted [facility], at pt's request, to initiate a referral and the discharge report has been submitted to the facility.  SREEDHAR provided pt. with a list of local facilities and a 24-hour hotline number if she needed more support prior to returning to [facility] for treatment.  Pt.'s mother asked about [place]'s Law to have pt. involuntary sent to rehab, however pt. reports being interested in treatment voluntarily.  SREEDHAR advised that after pt. has completed her upcoming medical appointments, it will be up to pt/family to contact the facility about bed availability and initiating treatment.  Pt. and parents verbalized understanding.  Pt. stated she had no further needs of SW.    Destination     No service has been selected for the patient.      Durable Medical Equipment     No service has been selected for the patient.      Dialysis/Infusion     No service has been selected for the patient.      Home Medical Care     No service has been selected for the patient.      Social Care     No service has been selected for the patient.             Final Discharge Disposition Code: 01 - home or self-care

## 2018-08-02 NOTE — PLAN OF CARE
Problem: Patient Care Overview  Goal: Plan of Care Review  Outcome: Ongoing (interventions implemented as appropriate)   08/02/18 0432   Coping/Psychosocial   Plan of Care Reviewed With patient   Plan of Care Review   Progress improving   OTHER   Outcome Summary denies pain/discomfort, CIWA of 0, no complaints or problems overnight     Goal: Discharge Needs Assessment  Outcome: Ongoing (interventions implemented as appropriate)   07/30/18 2100 07/31/18 1040 07/31/18 1602   Discharge Needs Assessment   Readmission Within the Last 30 Days --  --  no previous admission in last 30 days   Concerns to be Addressed --  --  no discharge needs identified   Patient/Family Anticipates Transition to --  --  inpatient rehabilitation facility   Patient/Family Anticipated Services at Transition --  --  rehabilitation services   Transportation Concerns --  --  car, none   Transportation Anticipated family or friend will provide --  --    Current Discharge Risk --  --  substance use/abuse   Discharge Coordination/Progress --  Has done a 30 day substance abuse rehab in [place]. --    Discharge Needs Assessment,    Outpatient/Agency/Support Group Needs --  --  outpatient substance abuse treatment (specify)   Anticipated Discharge Disposition --  --  still a patient       Problem: Anxiety (Adult)  Goal: Identify Related Risk Factors and Signs and Symptoms  Outcome: Outcome(s) achieved Date Met: 08/02/18 08/02/18 0432   Anxiety (Adult)   Related Risk Factors (Anxiety) knowledge deficit;health status change;substance use/abuse;coping ineffective;psychosocial factor     Goal: Reduction/Resolution  Outcome: Ongoing (interventions implemented as appropriate)   08/02/18 0432   Anxiety (Adult)   Reduction/Resolution making progress toward outcome

## 2018-08-02 NOTE — DISCHARGE PLACEMENT REQUEST
"Melba Ni (23 y.o. Female)     To [facility] admisisons    From Natacha Ramirez, [facility] [phone]        Date of Birth Social Security Number Address Home Phone MRN    1995  [address] [place] [address] [phone] [id number]    Jainism Marital Status          [place] Single       Admission Date Admission Type Admitting Provider Attending Provider Department, Room/Bed    18 Emergency Derrick Martin MD Russell, Marc P, MD [facility] 5G, S556/1    Discharge Date Discharge Disposition Discharge Destination         Home or Self Care              Attending Provider:  Derrick Martin MD    Allergies:  No Known Allergies    Isolation:  None   Infection:  None   Code Status:  CPR    Ht:  177.8 cm (70\")   Wt:  57.9 kg (127 lb 9.6 oz)    Admission Cmt:  None   Principal Problem:  Non-intractable vomiting with nausea [R11.2]                 Active Insurance as of 2018     Primary Coverage     Payor Plan Insurance Group Employer/Plan Group    [employer] BL[employer] [facility] [id number]     Payor Plan Address Payor Plan Phone Number Effective From Effective To    [address] [phone] 2017     [facility] [id number]       Subscriber Name Subscriber Birth Date Member ID       NIKOLAS NI 1963 [id number]                 Emergency Contacts      (Rel.) Home Phone Work Phone Mobile Phone    Jessica Ni (Mother) -- -- [phone]    Nikolas Ni (Father) -- -- [phone]               History & Physical      Day, Anyi BATEMAN MD at 2018 10:41 PM              [facility] Medicine Services  HISTORY AND PHYSICAL    Patient Name: Melba Ni  : 1995  MRN: [id number]  Primary Care Physician: Carlos Enrique Hook MD    Subjective   Subjective     Chief Complaint: Nausea and Vomiting     HPI:  Melba Ni is a 23 y.o. female with past medical history of alcohol abuse, neuropathy, and psoriasis presents " [facility] emergency department with complaints ofnausea and vomiting.  Patient reports that she has neuropathy secondary to her alcohol abuse, and has recently had some quit her job due to foot pain.  Patient states that she has been depressed and self-medicating with alcohol abuse.  She states that her longest stent of sobriety was last November, and since December she has continued to drink. She reports that she drinks a few shots up to a pint of liquor a day. She reports her last drink was last night, and she had 5 shots of fireball.  Patient reports that this morning she woke with right upper abdominal pain, nausea, vomiting, and weakness.while in the emergency department patient underwent ultrasound of her liver shows which shows hepatic steatosis and gallbladder sludge.  Patient has mild transaminase. Patient was given a banana bag and 1000mL NS bolus. Patient will be admitted to the hospital medicine service for further evaluation and treatment.     24 YO FEMALE W/ HX OF SIGNIFICANT ETOH ABUSE WHO HAS BEEN IN OUT OF STATE INPT REHAB FOR UP TO 30 DAYS BUT THEN IMMEDIATELY RELAPSED ONCE OUT WHO PRESENTS W/ ABD PAIN, N/V; HAS BEEN DRINKING A PINT OF VODKA PER DAY.  RECENTLY QUIT JOB AS .  PT STATES SHE FEELS WORSE THIS TIME THAN W/ RECENT PRIOR ADMIT FOR SAME.  STATES PAIN MORE CONSISTENT AND INTENSE.    Review of Systems   Constitutional: Negative for chills, diaphoresis, fatigue and fever.   Respiratory: Negative for cough, shortness of breath and wheezing.    Cardiovascular: Negative for chest pain, palpitations and leg swelling.   Gastrointestinal: Positive for abdominal pain, nausea and vomiting. Negative for abdominal distention, constipation and diarrhea.   Genitourinary: Positive for dysuria. Negative for frequency, hematuria and urgency.   Musculoskeletal: Negative for arthralgias and myalgias.   Skin: Negative for color change, pallor, rash and wound.   Neurological: Negative for  dizziness, syncope, weakness and light-headedness.   Psychiatric/Behavioral: Negative for agitation and confusion.   All other systems reviewed and are negative.     Otherwise 10-system ROS reviewed and is negative except as mentioned in the HPI.    Personal History     Past Medical History:   Diagnosis Date   • Asthma    • Hepatitis     chemical        History reviewed. No pertinent surgical history.    Family History: family history includes Alcohol abuse in her father; Drug abuse in her sister; Heart disease in her father and mother.     Social History:  reports that she has never smoked. She does not have any smokeless tobacco history on file. She reports that she drinks alcohol. She reports that she does not use drugs.  Social History     Social History Narrative   • No narrative on file       Medications:  Prescriptions Prior to Admission   Medication Sig Dispense Refill Last Dose   • B Complex-Biotin-FA (B-100 B-COMPLEX) tablet Take 1 tablet by mouth Daily.   7/29/2018 at Unknown time   • gabapentin (NEURONTIN) 300 MG capsule Take 600 mg by mouth 2 (Two) Times a Day. Pt taking 2 caps in the morning, 2 caps in the afternoon, and 3 caps at night.   7/29/2018 at Unknown time   • gabapentin (NEURONTIN) 300 MG capsule Take 900 mg by mouth Every Night. Pt taking 2 caps in the morning, 2 caps in the afternoon, and 3 caps at night.   7/29/2018 at Unknown time   • ibuprofen (ADVIL,MOTRIN) 200 MG tablet Take 200 mg by mouth As Needed for Mild Pain .   Past Month at Unknown time   • Magnesium 500 MG capsule Take 1 capsule by mouth Daily.   7/29/2018 at Unknown time   • Multiple Vitamins-Minerals (MULTIVITAMIN ADULT) tablet Take 1 tablet by mouth.   7/29/2018 at Unknown time   • albuterol (PROVENTIL HFA;VENTOLIN HFA) 108 (90 Base) MCG/ACT inhaler Inhale 2 puffs Every 4 (Four) Hours As Needed for Wheezing.   More than a month at Unknown time       No Known Allergies    Objective   Objective     Vital Signs:   Temp:  [98.7  °F (37.1 °C)-99.6 °F (37.6 °C)] 98.8 °F (37.1 °C)  Heart Rate:  [] 76  Resp:  [16-18] 16  BP: (119-140)/(75-87) 125/79        Physical Exam   Constitutional: She is oriented to person, place, and time. She appears well-developed and well-nourished.   HENT:   Head: Normocephalic and atraumatic.   Eyes: Pupils are equal, round, and reactive to light. EOM are normal. No scleral icterus.   Neck: Normal range of motion. Neck supple. No JVD present.   Cardiovascular: Normal rate, regular rhythm, normal heart sounds and intact distal pulses.  Exam reveals no gallop and no friction rub.    No murmur heard.  Pulmonary/Chest: Effort normal and breath sounds normal. No respiratory distress. She has no wheezes. She has no rales. She exhibits no tenderness.   Abdominal: Soft. Bowel sounds are normal. She exhibits no distension and no mass. There is tenderness in the right upper quadrant and epigastric area. There is no rebound and no guarding. No hernia.   Musculoskeletal: Normal range of motion. She exhibits no edema, tenderness or deformity.   Neurological: She is alert and oriented to person, place, and time.   Skin: Skin is warm and dry. Capillary refill takes less than 2 seconds. No rash noted. No erythema. No pallor.   Psychiatric: She has a normal mood and affect. Her behavior is normal. Judgment and thought content normal.   Nursing note and vitals reviewed.    GEN; ALERT, ORIENTED, TEARFUL  HEENT; PASTY MM  CV; RR W/TACHYCARDIA, NO MRG  L; CTAB, NO WHEEZE/CRACKLES  ABD; RUQ TTP W/ GUARDING, SOFT  EXT; NO CCE, 2+ PULSES  SKIN; JAUNDICE  NEURO; GROSSLY INTACT  PSYCH; MOOD AND AFFECT APPROPRIATE    Results Reviewed:  I have personally reviewed current lab, radiology, and data and agree.      Results from last 7 days  Lab Units 07/30/18  1519   WBC 10*3/mm3 4.40   HEMOGLOBIN g/dL 13.1   HEMATOCRIT % 38.2   PLATELETS 10*3/mm3 51*       Results from last 7 days  Lab Units 07/30/18  1519   SODIUM mmol/L 142   POTASSIUM  mmol/L 3.7   CHLORIDE mmol/L 102   CO2 mmol/L 27.0   BUN mg/dL <5*   CREATININE mg/dL 0.52*   GLUCOSE mg/dL 97   CALCIUM mg/dL 9.2   ALT (SGPT) U/L 52*   AST (SGOT) U/L 249*     Estimated Creatinine Clearance: 152.5 mL/min (A) (by C-G formula based on SCr of 0.52 mg/dL (L)).  Brief Urine Lab Results  (Last result in the past 365 days)      Color   Clarity   Blood   Leuk Est   Nitrite   Protein   CREAT   Urine HCG        07/30/18 1712               Negative         No results found for: BNP  Imaging Results (last 24 hours)     Procedure Component Value Units Date/Time    US Liver [[id number]] Collected:  07/30/18 1734     Updated:  07/30/18 1844    Narrative:       EXAM:    US Abdomen Limited, Right Upper Quadrant    EXAM DATE/TIME:    7/30/2018 5:34 PM    CLINICAL HISTORY:    23 years old, female; Abnormal findings; Abnormal lab test; Elevated liver   enzymes; Additional info: Elevated bilirubin/les.    TECHNIQUE:    Real-time ultrasound of the right upper quadrant with image documentation.    COMPARISON:    No relevant prior studies available.    FINDINGS:    Liver:  Increased echoes within the liver. At the superior course.  No   intrahepatic bile duct dilation.    Gallbladder:  Evidence suggesting possible small amount of pericholecystic   fluid. Prominent gallbladder wall thickness estimated 2.8 mm. Evidence of   layering sludge/debris in the gallbladder. Positive sonographic Berry sign   reported by the technician.    Common bile duct: Diameter measures 1 mm.  No stones.  No dilation.    Pancreas:  Unremarkable as visualized.    Right kidney: Unremarkable.  No stones.  No solid mass.  No hydronephrosis.         Impression:       Hepatic steatosis.    Gallbladder sludge. Upper limits of normal gallbladder wall size. Evidence   suggesting small amount of pericholecystic fluid possibly wall edema. Positive   sonographic Berry's sign reported by the technician.      THIS DOCUMENT HAS BEEN ELECTRONICALLY SIGNED BY  MATTHEW KAISER MD    XR Chest 1 View [[id number]] Collected:  07/30/18 1608     Updated:  07/30/18 1649    Narrative:       EXAMINATION: XR CHEST 1 VW-07/30/2018:      INDICATION: Upper abdominal pain, triage protocol.      COMPARISON: NONE.     FINDINGS: Cardiac size within normal limits. Pulmonary vascularity  within normal limits. No focal opacification or consolidation. No  pneumothorax or pleural effusion. No acute osseous abnormality. No free  air underneath the hemidiaphragms on this portable erect view.           Impression:       No acute cardiopulmonary process. No free air underneath the  hemidiaphragms on this portable erect view.     D:  07/30/2018  E:  07/30/2018     This report was finalized on 7/30/2018 4:47 PM by Dr. Lazaro Lao.           Assessment/Plan   Assessment / Plan     Hospital Problem List     * (Principal)Non-intractable vomiting with nausea    Abdominal pain    Acute alcoholic hepatitis    Hyperbilirubinemia    Acute cystitis    Thrombocytopenia (CMS/HCC)        Assessment & Plan:  22 Y/O FEMALE W/ SIGNIFICANT HX OF ETOH ABUSE W/ ALCOHOLIC HEPATITIS, THROMBOCYTOPENIA, HYPERBILIRUBINEMIA; PT SIGNIFICANTLY TENDER SO WILL HAVE GEN SURG TO SEE IN ADDITION TO GI (FRANCISCO CONTACTED PRIOR TO ADMIT) AND MAY BENEFIT FROM MRCP; PAIN CONTROL, IVF'S, NAUSEA CONTROL.  NO HX OF DT'S IN PAST; CIWA PROTOCOL. PT INTERESTED IN REHAB AGAIN.    - Admit to telemetry  - VS q4h  - Consult to GI   - ?Alcoholic Hepatitis  - Consult to General Surgery   - Gallbladder sludge   - RUQ abdominal pain  - IVF overnight  - CIWA protocol  - Pain management  - Antiemetics  - AM Labs  - Consult to Case Management   - Patient wants to go to rehab for her alcohol use when discharged     DVT prophylaxis: SCDs    CODE STATUS:    Code Status and Medical Interventions:   Ordered at: 07/30/18 6124     Level Of Support Discussed With:    Patient     Code Status:    CPR     Medical Interventions (Level of Support Prior to Arrest):     Full       Admission Status:  I believe this patient meets INPATIENT status due to the need for care which can only be reasonably provided in an hospital setting such as aggressive/expedited ancillary services and/or consultation services, the necessity for IV medications, close physician monitoring and/or the possible need for procedures.  In such, I feel patient’s risk for adverse outcomes and need for care warrant INPATIENT evaluation and predict the patient’s care encounter to likely last beyond 2 midnights.    Electronically signed by TAMERA Briggs, 18, 10:41 PM.  Electronically signed by Anyi Levy MD, 18, 3:48 AM.      Electronically signed by Anyi Levy MD at 2018  3:48 AM          Discharge Summary      Derrick Martin MD at 2018  9:47 AM              [facility] Medicine Services  DISCHARGE SUMMARY    Patient Name: Melba Terry  : 1995  MRN: [id number]    Date of Admission: 2018  Date of Discharge:  2018  Primary Care Physician: Carlos Enrique Hook MD    Consults     Date and Time Order Name Status Description    2018 2316 Inpatient General Surgery Consult Completed     2018 2316 Inpatient Gastroenterology Consult Completed         Hospital Course     Presenting Problem:   Non-intractable vomiting with nausea, unspecified vomiting type [R11.2]  Abdominal pain [R10.9]  Acute alcoholic hepatitis [K70.10]  Acute alcoholic hepatitis [K70.10]    Active Hospital Problems    Diagnosis Date Noted   • **Non-intractable vomiting with nausea [R11.2] 2018   • Hyperbilirubinemia [E80.6] 2018   • Acute cystitis [N30.00] 2018   • Thrombocytopenia (CMS/HCC) [D69.6] 2018   • Abdominal pain [R10.9] 2018   • Acute alcoholic hepatitis [K70.10] 2018      Resolved Hospital Problems    Diagnosis Date Noted Date Resolved   No resolved problems to display.          Hospital Course:  Melba Terry is a 23 y.o.  female here with acute alcoholic hepatitis. She has associated early cirrhotic changes as well. She was seen in consultation by GI and general surgery. Nothing surgical was required for her biliary sludge. She was started on steroids and trental for her hepatitis and her liver function tests and bilirubin levels continue to improve. She was given vitamin K, and her INR has not increased but has not returned to normal either. At this point, she is not experiencing any withdrawal symptoms, she is tolerating po, and her liver function tests are improving. She will be discharged on a steroid taper and continue trental. She will follow up with GI in 1 week with CMP/PT/INR.    She will seek inpatient rehab in [place] for alcohol and substance abuse. We have recommended outpatient rehab as a bridge between her inpatient therapy so that she can follow up with GI prior to rehabilitation. She may eventually need a hepatology specialist however, her liver function should continue improve should she continue to abstain from alcohol use.          Day of Discharge     HPI:   Tolerating PO. Anxious. No tremors. No hallucinations. No f/c. No n/v. No diarrhea.    Review of Systems      Otherwise ROS is negative except as mentioned in the HPI.    Vital Signs:   Temp:  [98.2 °F (36.8 °C)-98.4 °F (36.9 °C)] 98.3 °F (36.8 °C)  Heart Rate:  [] 88  Resp:  [14-18] 14  BP: (105-118)/(65-80) 111/67     Physical Exam:  NAD, alert and oriented  OP clear, MMM  PERRL  Neck supple  RRR  CTAB  +BS, ND, NT  DAWN  No c/c/e  No rashes    Pertinent  and/or Most Recent Results       Results from last 7 days  Lab Units 08/02/18  0658 08/01/18  0837 07/31/18  2033 07/31/18  0440 07/30/18  1519   WBC 10*3/mm3  --   --   --  3.65 4.40   HEMOGLOBIN g/dL  --   --   --  10.9* 13.1   HEMATOCRIT %  --   --   --  32.9* 38.2   PLATELETS 10*3/mm3  --   --   --  34* 51*   SODIUM mmol/L 139 138  --  140 142   POTASSIUM mmol/L 3.2* 4.3 4.0 3.1* 3.7   CHLORIDE  mmol/L 108 108  --  107 102   CO2 mmol/L 23.0 22.0  --  24.0 27.0   BUN mg/dL 5* <5*  --  <5* <5*   CREATININE mg/dL 0.39* 0.42*  --  0.38* 0.52*   GLUCOSE mg/dL 103* 158*  --  86 97   CALCIUM mg/dL 7.8* 8.2*  --  7.9* 9.2       Results from last 7 days  Lab Units 08/02/18  0658 08/01/18  0837 07/31/18  2330 07/31/18  0440 07/30/18  1519   BILIRUBIN mg/dL 3.2* 4.3* 5.2*  --  5.9*   ALK PHOS U/L 177* 155* 150*  --  202*   ALT (SGPT) U/L 28 30 33  --  52*   AST (SGOT) U/L 107* 121* 150*  --  249*   PROTIME Seconds  --  17.6*  --  17.8*  --    INR   --  1.68*  --  1.70*  --            Invalid input(s): TG, LDLCALC, LDLREALC      Brief Urine Lab Results  (Last result in the past 365 days)      Color   Clarity   Blood   Leuk Est   Nitrite   Protein   CREAT   Urine HCG        07/30/18 1712               Negative           Microbiology Results Abnormal     None          Imaging Results (all)     Procedure Component Value Units Date/Time    MRI abdomen wo contrast mrcp [[id number]] Collected:  08/01/18 0949     Updated:  08/01/18 0952    Narrative:       EXAMINATION: MRI ABDOMEN WO CONTRAST MRCP-      INDICATION: Abnormal liver function tests (LFTs).     TECHNIQUE: Multiplanar MRI of the abdomen without intravenous contrast  following MRCP protocol.        COMPARISON: None.     FINDINGS: Lung bases are grossly clear. Liver demonstrates enlarged  appearance with moderate signal drop on opposed phase imaging diffusely  throughout the parenchyma consistent of hepatic steatosis. No focal  liver lesion is noted, however. Gallbladder is present and without  discrete filling defect to suggest cholelithiasis. Pericholecystic fluid  is present in the gallbladder fossa which may represent primary  gallbladder inflammation or secondary inflammation of the adjacent  hepatic parenchyma. No intrahepatic biliary dilatation with common bile  duct in normal appearance demonstrating smooth tapering at the level of  the ampulla without filling  defect identified to suggest  choledocholithiasis. Pancreatic parenchyma is grossly within normal  limits. No main pancreatic ductal dilatation. No pancreas divisum is  identified. Spleen is enlarged to 16 cm in craniocaudal dimension.  Adrenals without distinct nodule. Kidneys without hydronephrosis or  hydroureter. Nonaneurysmal abdominal aorta. No bulky retroperitoneal  adenopathy. No aggressive bone marrow signal. Trace mesenteric edema and  trace ascites primarily in perihepatic and perisplenic location.           Impression:       1. Hepatosplenomegaly with trace ascites.  2. Diffuse moderate signal drop on opposed phase imaging consistent with  hepatic steatosis. No focal liver lesion is identified.  3. Pericholecystic fluid in the gallbladder fossa suggesting potential  gallbladder inflammation or secondary inflammation from adjacent hepatic  parenchymal process. No distinct cholelithiasis or choledocholithiasis  is observed.     D:  08/01/2018  E:  08/01/2018     This report was finalized on 8/1/2018 9:50 AM by Dr. Lazaro Lao.       US Liver [[id number]] Collected:  07/30/18 1734     Updated:  07/30/18 1844    Narrative:       EXAM:    US Abdomen Limited, Right Upper Quadrant    EXAM DATE/TIME:    7/30/2018 5:34 PM    CLINICAL HISTORY:    23 years old, female; Abnormal findings; Abnormal lab test; Elevated liver   enzymes; Additional info: Elevated bilirubin/les.    TECHNIQUE:    Real-time ultrasound of the right upper quadrant with image documentation.    COMPARISON:    No relevant prior studies available.    FINDINGS:    Liver:  Increased echoes within the liver. At the superior course.  No   intrahepatic bile duct dilation.    Gallbladder:  Evidence suggesting possible small amount of pericholecystic   fluid. Prominent gallbladder wall thickness estimated 2.8 mm. Evidence of   layering sludge/debris in the gallbladder. Positive sonographic Berry sign   reported by the technician.    Common bile duct:  Diameter measures 1 mm.  No stones.  No dilation.    Pancreas:  Unremarkable as visualized.    Right kidney: Unremarkable.  No stones.  No solid mass.  No hydronephrosis.         Impression:       Hepatic steatosis.    Gallbladder sludge. Upper limits of normal gallbladder wall size. Evidence   suggesting small amount of pericholecystic fluid possibly wall edema. Positive   sonographic Berry's sign reported by the technician.      THIS DOCUMENT HAS BEEN ELECTRONICALLY SIGNED BY MATTHEW KAISER MD    XR Chest 1 View [[id number]] Collected:  07/30/18 1608     Updated:  07/30/18 1649    Narrative:       EXAMINATION: XR CHEST 1 VW-07/30/2018:      INDICATION: Upper abdominal pain, triage protocol.      COMPARISON: NONE.     FINDINGS: Cardiac size within normal limits. Pulmonary vascularity  within normal limits. No focal opacification or consolidation. No  pneumothorax or pleural effusion. No acute osseous abnormality. No free  air underneath the hemidiaphragms on this portable erect view.           Impression:       No acute cardiopulmonary process. No free air underneath the  hemidiaphragms on this portable erect view.     D:  07/30/2018  E:  07/30/2018     This report was finalized on 7/30/2018 4:47 PM by Dr. Lazaro Lao.                              Discharge Details        Discharge Medications      New Medications      Instructions Start Date   pentoxifylline 400 MG CR tablet  Commonly known as:  TRENtal   400 mg, Oral, 3 Times Daily With Meals      prednisoLONE 15 MG/5ML solution  Commonly known as:  ORAPRED   30 mg, Oral, See Admin Instructions, Taper as written      zinc sulfate 220 (50 Zn) MG capsule  Commonly known as:  ZINCATE   220 mg, Oral, 2 Times Daily         Continue These Medications      Instructions Start Date   albuterol 108 (90 Base) MCG/ACT inhaler  Commonly known as:  PROVENTIL HFA;VENTOLIN HFA   2 puffs, Inhalation, Every 4 Hours PRN      B-100 B-COMPLEX tablet   1 tablet, Oral, Daily       gabapentin 300 MG capsule  Commonly known as:  NEURONTIN   600 mg, Oral, 2 Times Daily, Pt taking 2 caps in the morning, 2 caps in the afternoon, and 3 caps at night.      gabapentin 300 MG capsule  Commonly known as:  NEURONTIN   900 mg, Oral, Nightly, Pt taking 2 caps in the morning, 2 caps in the afternoon, and 3 caps at night.       ibuprofen 200 MG tablet  Commonly known as:  ADVIL,MOTRIN   200 mg, Oral, As Needed      Magnesium 500 MG capsule   1 capsule, Oral, Daily      MULTIVITAMIN ADULT tablet   1 tablet, Oral               Discharge Disposition:  Home or Self Care    Discharge Diet:  Diet Instructions     Advance Diet As Tolerated             Discharge Activity:   Activity Instructions     Activity as Tolerated                   Code Status/Level of Support:  Code Status and Medical Interventions:   Ordered at: 07/30/18 7177     Level Of Support Discussed With:    Patient     Code Status:    CPR     Medical Interventions (Level of Support Prior to Arrest):    Full       No future appointments.    Additional Instructions for the Follow-ups that You Need to Schedule     Discharge Follow-up with Specified Provider: DELORES Sky with CMP, and PT/INR that day; 1 Week    As directed      To:  DELORES Sky with CMP, and PT/INR that day    Follow Up:  1 Week               Time Spent on Discharge:  40 minutes    Electronically signed by Derrick Martin MD, 08/02/18, 9:47 AM.        Electronically signed by Derrick Martin MD at 8/2/2018  9:52 AM

## 2018-08-03 ENCOUNTER — READMISSION MANAGEMENT (OUTPATIENT)
Dept: CALL CENTER | Facility: HOSPITAL | Age: 23
End: 2018-08-03

## 2018-08-03 NOTE — OUTREACH NOTE
Prep Survey      Responses   Facility patient discharged from?  [place]   Is patient eligible?  No   What are the reasons patient is not eligible?  [facility]   Discharge diagnosis  Alcoholic hepatitis   Does the patient have one of the following disease processes/diagnoses(primary or secondary)?  Other   Prep survey completed?  Yes          Keren Boogie RN

## 2018-08-07 NOTE — DISCHARGE SUMMARY
[facility] Medicine Services  DISCHARGE SUMMARY    Patient Name: Melba Terry  : 1995  MRN: [id number]    Date of Admission: 2018  Date of Discharge:  2018  Primary Care Physician: Carlos Enrique Hook MD    Consults     Date and Time Order Name Status Description    2018 2316 Inpatient General Surgery Consult Completed     2018 2316 Inpatient Gastroenterology Consult Completed         Hospital Course     Presenting Problem:   Non-intractable vomiting with nausea, unspecified vomiting type [R11.2]  Abdominal pain [R10.9]  Acute alcoholic hepatitis [K70.10]  Acute alcoholic hepatitis [K70.10]    Active Hospital Problems    Diagnosis Date Noted   • **Non-intractable vomiting with nausea [R11.2] 2018   • Hyperbilirubinemia [E80.6] 2018   • Acute cystitis [N30.00] 2018   • Thrombocytopenia (CMS/HCC) [D69.6] 2018   • Abdominal pain [R10.9] 2018   • Acute alcoholic hepatitis [K70.10] 2018      Resolved Hospital Problems    Diagnosis Date Noted Date Resolved   No resolved problems to display.          Hospital Course:  Melba Terry is a 23 y.o. female here with acute alcoholic hepatitis. She has associated early cirrhotic changes as well. She was seen in consultation by GI and general surgery. Nothing surgical was required for her biliary sludge. She was started on steroids and trental for her hepatitis and her liver function tests and bilirubin levels continue to improve. She was given vitamin K, and her INR has not increased but has not returned to normal either. At this point, she is not experiencing any withdrawal symptoms, she is tolerating po, and her liver function tests are improving. She will be discharged on a steroid taper and continue trental. She will follow up with GI in 1 week with CMP/PT/INR.    She will seek inpatient rehab in [place] for alcohol and substance abuse. We have recommended outpatient rehab as a bridge  Lm insurance denied medication and needs an appt to change medication   between her inpatient therapy so that she can follow up with GI prior to rehabilitation. She may eventually need a hepatology specialist however, her liver function should continue improve should she continue to abstain from alcohol use.          Day of Discharge     HPI:   Tolerating PO. Anxious. No tremors. No hallucinations. No f/c. No n/v. No diarrhea.    Review of Systems      Otherwise ROS is negative except as mentioned in the HPI.    Vital Signs:   Temp:  [98.2 °F (36.8 °C)-98.4 °F (36.9 °C)] 98.3 °F (36.8 °C)  Heart Rate:  [] 88  Resp:  [14-18] 14  BP: (105-118)/(65-80) 111/67     Physical Exam:  NAD, alert and oriented  OP clear, MMM  PERRL  Neck supple  RRR  CTAB  +BS, ND, NT  DAWN  No c/c/e  No rashes    Pertinent  and/or Most Recent Results       Results from last 7 days  Lab Units 08/02/18 0658 08/01/18 0837 07/31/18 2033 07/31/18 0440 07/30/18  1519   WBC 10*3/mm3  --   --   --  3.65 4.40   HEMOGLOBIN g/dL  --   --   --  10.9* 13.1   HEMATOCRIT %  --   --   --  32.9* 38.2   PLATELETS 10*3/mm3  --   --   --  34* 51*   SODIUM mmol/L 139 138  --  140 142   POTASSIUM mmol/L 3.2* 4.3 4.0 3.1* 3.7   CHLORIDE mmol/L 108 108  --  107 102   CO2 mmol/L 23.0 22.0  --  24.0 27.0   BUN mg/dL 5* <5*  --  <5* <5*   CREATININE mg/dL 0.39* 0.42*  --  0.38* 0.52*   GLUCOSE mg/dL 103* 158*  --  86 97   CALCIUM mg/dL 7.8* 8.2*  --  7.9* 9.2       Results from last 7 days  Lab Units 08/02/18 0658 08/01/18 0837 07/31/18 2330 07/31/18 0440 07/30/18  1519   BILIRUBIN mg/dL 3.2* 4.3* 5.2*  --  5.9*   ALK PHOS U/L 177* 155* 150*  --  202*   ALT (SGPT) U/L 28 30 33  --  52*   AST (SGOT) U/L 107* 121* 150*  --  249*   PROTIME Seconds  --  17.6*  --  17.8*  --    INR   --  1.68*  --  1.70*  --            Invalid input(s): TG, LDLCALC, LDLREALC      Brief Urine Lab Results  (Last result in the past 365 days)      Color   Clarity   Blood   Leuk Est   Nitrite   Protein   CREAT   Urine HCG        07/30/18 2436                Negative           Microbiology Results Abnormal     None          Imaging Results (all)     Procedure Component Value Units Date/Time    MRI abdomen wo contrast mrcp [[id number]] Collected:  08/01/18 0949     Updated:  08/01/18 0952    Narrative:       EXAMINATION: MRI ABDOMEN WO CONTRAST MRCP-      INDICATION: Abnormal liver function tests (LFTs).     TECHNIQUE: Multiplanar MRI of the abdomen without intravenous contrast  following MRCP protocol.        COMPARISON: None.     FINDINGS: Lung bases are grossly clear. Liver demonstrates enlarged  appearance with moderate signal drop on opposed phase imaging diffusely  throughout the parenchyma consistent of hepatic steatosis. No focal  liver lesion is noted, however. Gallbladder is present and without  discrete filling defect to suggest cholelithiasis. Pericholecystic fluid  is present in the gallbladder fossa which may represent primary  gallbladder inflammation or secondary inflammation of the adjacent  hepatic parenchyma. No intrahepatic biliary dilatation with common bile  duct in normal appearance demonstrating smooth tapering at the level of  the ampulla without filling defect identified to suggest  choledocholithiasis. Pancreatic parenchyma is grossly within normal  limits. No main pancreatic ductal dilatation. No pancreas divisum is  identified. Spleen is enlarged to 16 cm in craniocaudal dimension.  Adrenals without distinct nodule. Kidneys without hydronephrosis or  hydroureter. Nonaneurysmal abdominal aorta. No bulky retroperitoneal  adenopathy. No aggressive bone marrow signal. Trace mesenteric edema and  trace ascites primarily in perihepatic and perisplenic location.           Impression:       1. Hepatosplenomegaly with trace ascites.  2. Diffuse moderate signal drop on opposed phase imaging consistent with  hepatic steatosis. No focal liver lesion is identified.  3. Pericholecystic fluid in the gallbladder fossa suggesting potential  gallbladder  inflammation or secondary inflammation from adjacent hepatic  parenchymal process. No distinct cholelithiasis or choledocholithiasis  is observed.     D:  08/01/2018  E:  08/01/2018     This report was finalized on 8/1/2018 9:50 AM by Dr. Lazaro Lao.       US Liver [[id number]] Collected:  07/30/18 1734     Updated:  07/30/18 1844    Narrative:       EXAM:    US Abdomen Limited, Right Upper Quadrant    EXAM DATE/TIME:    7/30/2018 5:34 PM    CLINICAL HISTORY:    23 years old, female; Abnormal findings; Abnormal lab test; Elevated liver   enzymes; Additional info: Elevated bilirubin/les.    TECHNIQUE:    Real-time ultrasound of the right upper quadrant with image documentation.    COMPARISON:    No relevant prior studies available.    FINDINGS:    Liver:  Increased echoes within the liver. At the superior course.  No   intrahepatic bile duct dilation.    Gallbladder:  Evidence suggesting possible small amount of pericholecystic   fluid. Prominent gallbladder wall thickness estimated 2.8 mm. Evidence of   layering sludge/debris in the gallbladder. Positive sonographic Berry sign   reported by the technician.    Common bile duct: Diameter measures 1 mm.  No stones.  No dilation.    Pancreas:  Unremarkable as visualized.    Right kidney: Unremarkable.  No stones.  No solid mass.  No hydronephrosis.         Impression:       Hepatic steatosis.    Gallbladder sludge. Upper limits of normal gallbladder wall size. Evidence   suggesting small amount of pericholecystic fluid possibly wall edema. Positive   sonographic Berry's sign reported by the technician.      THIS DOCUMENT HAS BEEN ELECTRONICALLY SIGNED BY MATTHEW KAISER MD    XR Chest 1 View [[id number]] Collected:  07/30/18 1608     Updated:  07/30/18 1649    Narrative:       EXAMINATION: XR CHEST 1 VW-07/30/2018:      INDICATION: Upper abdominal pain, triage protocol.      COMPARISON: NONE.     FINDINGS: Cardiac size within normal limits. Pulmonary vascularity  within  normal limits. No focal opacification or consolidation. No  pneumothorax or pleural effusion. No acute osseous abnormality. No free  air underneath the hemidiaphragms on this portable erect view.           Impression:       No acute cardiopulmonary process. No free air underneath the  hemidiaphragms on this portable erect view.     D:  07/30/2018  E:  07/30/2018     This report was finalized on 7/30/2018 4:47 PM by Dr. Lazaro Lao.                              Discharge Details        Discharge Medications      New Medications      Instructions Start Date   pentoxifylline 400 MG CR tablet  Commonly known as:  TRENtal   400 mg, Oral, 3 Times Daily With Meals      prednisoLONE 15 MG/5ML solution  Commonly known as:  ORAPRED   30 mg, Oral, See Admin Instructions, Taper as written      zinc sulfate 220 (50 Zn) MG capsule  Commonly known as:  ZINCATE   220 mg, Oral, 2 Times Daily         Continue These Medications      Instructions Start Date   albuterol 108 (90 Base) MCG/ACT inhaler  Commonly known as:  PROVENTIL HFA;VENTOLIN HFA   2 puffs, Inhalation, Every 4 Hours PRN      B-100 B-COMPLEX tablet   1 tablet, Oral, Daily      gabapentin 300 MG capsule  Commonly known as:  NEURONTIN   600 mg, Oral, 2 Times Daily, Pt taking 2 caps in the morning, 2 caps in the afternoon, and 3 caps at night.      gabapentin 300 MG capsule  Commonly known as:  NEURONTIN   900 mg, Oral, Nightly, Pt taking 2 caps in the morning, 2 caps in the afternoon, and 3 caps at night.       ibuprofen 200 MG tablet  Commonly known as:  ADVIL,MOTRIN   200 mg, Oral, As Needed      Magnesium 500 MG capsule   1 capsule, Oral, Daily      MULTIVITAMIN ADULT tablet   1 tablet, Oral               Discharge Disposition:  Home or Self Care    Discharge Diet:  Diet Instructions     Advance Diet As Tolerated             Discharge Activity:   Activity Instructions     Activity as Tolerated                   Code Status/Level of Support:  Code Status and Medical  Interventions:   Ordered at: 07/30/18 9978     Level Of Support Discussed With:    Patient     Code Status:    CPR     Medical Interventions (Level of Support Prior to Arrest):    Full       No future appointments.    Additional Instructions for the Follow-ups that You Need to Schedule     Discharge Follow-up with Specified Provider: DELORES Sky with CMP, and PT/INR that day; 1 Week    As directed      To:  DELORES Sky with CMP, and PT/INR that day    Follow Up:  1 Week               Time Spent on Discharge:  40 minutes    Electronically signed by Derrick Martin MD, 08/02/18, 9:47 AM.

## 2018-08-08 ENCOUNTER — OFFICE VISIT (OUTPATIENT)
Dept: GASTROENTEROLOGY | Facility: CLINIC | Age: 23
End: 2018-08-08

## 2018-08-08 VITALS
BODY MASS INDEX: 18.32 KG/M2 | RESPIRATION RATE: 14 BRPM | OXYGEN SATURATION: 99 % | HEART RATE: 105 BPM | SYSTOLIC BLOOD PRESSURE: 130 MMHG | WEIGHT: 128 LBS | DIASTOLIC BLOOD PRESSURE: 78 MMHG | TEMPERATURE: 97.7 F | HEIGHT: 70 IN

## 2018-08-08 DIAGNOSIS — K70.10 ALCOHOLIC HEPATITIS WITHOUT ASCITES: Primary | ICD-10-CM

## 2018-08-08 PROCEDURE — 99204 OFFICE O/P NEW MOD 45 MIN: CPT | Performed by: INTERNAL MEDICINE

## 2018-08-08 NOTE — PROGRESS NOTES
PCP: Carlos Enrique Hook MD    Chief Complaint   Patient presents with   • Hepatic Disease        History of Present Illness:   Melba Teryr is a 23 y.o. female who presents to the GI clinic for assessment of acute liver injury with concern for failure. Deemed to be due to alcoholic hepatitis. Presented with nausea, vomiting and sensation of heart racing. Found to have ast > 200, t bili > 6, inr > 1.5. Given steroids with lille score response. Admission date was late July 2018. Now doing well. Typically drank 8 shots of fireball liquor a day for > 3 years. Worked as a . Started drinking at age 11, father is an alcoholic.  Eats small amounts throughout the day.    Past Medical History:   Diagnosis Date   • Asthma    • Hepatitis     chemical        History reviewed. No pertinent surgical history.      Current Outpatient Prescriptions:   •  albuterol (PROVENTIL HFA;VENTOLIN HFA) 108 (90 Base) MCG/ACT inhaler, Inhale 2 puffs Every 4 (Four) Hours As Needed for Wheezing., Disp: , Rfl:   •  B Complex-Biotin-FA (B-100 B-COMPLEX) tablet, Take 1 tablet by mouth Daily., Disp: , Rfl:   •  gabapentin (NEURONTIN) 300 MG capsule, Take 600 mg by mouth 2 (Two) Times a Day. Pt taking 2 caps in the morning, 2 caps in the afternoon, and 3 caps at night., Disp: , Rfl:   •  gabapentin (NEURONTIN) 300 MG capsule, Take 900 mg by mouth Every Night. Pt taking 2 caps in the morning, 2 caps in the afternoon, and 3 caps at night., Disp: , Rfl:   •  ibuprofen (ADVIL,MOTRIN) 200 MG tablet, Take 200 mg by mouth As Needed for Mild Pain ., Disp: , Rfl:   •  Magnesium 500 MG capsule, Take 1 capsule by mouth Daily., Disp: , Rfl:   •  Multiple Vitamins-Minerals (MULTIVITAMIN ADULT) tablet, Take 1 tablet by mouth., Disp: , Rfl:   •  pentoxifylline (TRENtal) 400 MG CR tablet, Take 1 tablet by mouth 3 (Three) Times a Day With Meals., Disp: 90 tablet, Rfl: 0  •  prednisoLONE (ORAPRED) 15 MG/5ML solution, Take 10 mL by mouth See Admin  Instructions., Disp: , Rfl:   •  predniSONE (DELTASONE) 5 MG tablet, Take 6tabs by mouth daily x4days, then 5tabs x4days, then 4tabs x4days, then 3tabs x4days, then 2tabs x4days, then 1tab x4days, then stop., Disp: 84 tablet, Rfl: 0  •  zinc sulfate (ZINCATE) 220 (50 Zn) MG capsule, Take 1 capsule by mouth 2 (Two) Times a Day., Disp: 60 capsule, Rfl: 2    No Known Allergies    Family History   Problem Relation Age of Onset   • Heart disease Mother    • Alcohol abuse Father    • Heart disease Father    • Drug abuse Sister        Social History     Social History   • Marital status: Single     Spouse name: N/A   • Number of children: N/A   • Years of education: N/A     Occupational History   • Not on file.     Social History Main Topics   • Smoking status: Never Smoker   • Smokeless tobacco: Never Used   • Alcohol use No      Comment: 1 pint at least daily (nothing since 07/29/2018)   • Drug use: No   • Sexual activity: Defer     Other Topics Concern   • Not on file     Social History Narrative   • No narrative on file       Review of Systems   Psychiatric/Behavioral: The patient is nervous/anxious.    All other systems reviewed and are negative.    All other systems reviewed and are negative.    Vitals:    08/08/18 1402   BP: 130/78   Pulse: 105   Resp: 14   Temp: 97.7 °F (36.5 °C)   SpO2: 99%       Physical Exam  General Appearance:  Vitals as above. no acute distress  Thin,   Head/face:  Normocephalic, atraumatic  Eyes:   EOMI, scleral icteris  Nose/Sinuses:  Nares patent bilaterally without discharge or lesions  Mouth/Throat:  Posterior pharynx is pink without drainage or exudates;  dentition is in good condition and repair  Neck:  trachea is midline, no thyromegaly  Lungs:  Clear to auscultation bilaterally  Heart:  Regular rate and rhythm without murmur, gallop or rub  Abdomen:  Soft, non-tender to palpation, no obvious ascites  Neurologic:  Alert; no focal deficits; age appropriate behavior and  speech  Psychiatric: mood and affect are congruent  Vascular: extremities without edema  Skin: mild jaundice      Assessment/Plan  1.) Acute liver injury with concern for failure due to alcoholic hepatitis  ON steroid course due to DF score and subsequent lille score.  -Family and job with extensive alcohol use/surrounding    Plan:  Will continue steroid course for 4 weeks with rapid taper  Continue mvi supplementation  On neurontin to curve craves and help with her neuropathy  I think she would benefit from seeing nutrition  I think the biggest trotter for her is her young age with friends/family and environment combined with anxiety. I voiced my preference to send her to psychiatry and she will consider.  WIll assess repeat cmp with other liver injury etiologies in 2 weeks per her preference.    rtc in 4 weeks.    David Santiago MD  8/8/2018

## 2018-08-20 ENCOUNTER — TELEPHONE (OUTPATIENT)
Dept: GASTROENTEROLOGY | Facility: CLINIC | Age: 23
End: 2018-08-20

## 2018-08-31 ENCOUNTER — HOSPITAL ENCOUNTER (OUTPATIENT)
Dept: NUTRITION | Facility: HOSPITAL | Age: 23
Setting detail: RECURRING SERIES
End: 2018-08-31

## 2018-09-05 ENCOUNTER — TELEPHONE (OUTPATIENT)
Dept: GASTROENTEROLOGY | Facility: CLINIC | Age: 23
End: 2018-09-05

## 2018-09-06 ENCOUNTER — LAB (OUTPATIENT)
Dept: LAB | Facility: HOSPITAL | Age: 23
End: 2018-09-06

## 2018-09-06 DIAGNOSIS — K70.10 ALCOHOLIC HEPATITIS WITHOUT ASCITES: ICD-10-CM

## 2018-09-06 LAB
ALBUMIN SERPL-MCNC: 4.11 G/DL (ref 3.2–4.8)
ALBUMIN/GLOB SERPL: 1.5 G/DL (ref 1.5–2.5)
ALP SERPL-CCNC: 114 U/L (ref 25–100)
ALT SERPL W P-5'-P-CCNC: 35 U/L (ref 7–40)
ANION GAP SERPL CALCULATED.3IONS-SCNC: 11 MMOL/L (ref 3–11)
AST SERPL-CCNC: 75 U/L (ref 0–33)
BILIRUB SERPL-MCNC: 3.9 MG/DL (ref 0.3–1.2)
BUN BLD-MCNC: 7 MG/DL (ref 9–23)
BUN/CREAT SERPL: 11.9 (ref 7–25)
CALCIUM SPEC-SCNC: 8.9 MG/DL (ref 8.7–10.4)
CHLORIDE SERPL-SCNC: 101 MMOL/L (ref 99–109)
CO2 SERPL-SCNC: 25 MMOL/L (ref 20–31)
CREAT BLD-MCNC: 0.59 MG/DL (ref 0.6–1.3)
GFR SERPL CREATININE-BSD FRML MDRD: 125 ML/MIN/1.73
GLOBULIN UR ELPH-MCNC: 2.7 GM/DL
GLUCOSE BLD-MCNC: 80 MG/DL (ref 70–100)
INR PPP: 1.3 (ref 0.91–1.09)
POTASSIUM BLD-SCNC: 3.5 MMOL/L (ref 3.5–5.5)
PROT SERPL-MCNC: 6.8 G/DL (ref 5.7–8.2)
PROTHROMBIN TIME: 13.7 SECONDS (ref 9.6–11.5)
SODIUM BLD-SCNC: 137 MMOL/L (ref 132–146)

## 2018-09-06 PROCEDURE — 82103 ALPHA-1-ANTITRYPSIN TOTAL: CPT

## 2018-09-06 PROCEDURE — 36415 COLL VENOUS BLD VENIPUNCTURE: CPT

## 2018-09-06 PROCEDURE — 83516 IMMUNOASSAY NONANTIBODY: CPT

## 2018-09-06 PROCEDURE — 81256 HFE GENE: CPT

## 2018-09-06 PROCEDURE — 86704 HEP B CORE ANTIBODY TOTAL: CPT

## 2018-09-06 PROCEDURE — 86708 HEPATITIS A ANTIBODY: CPT

## 2018-09-06 PROCEDURE — 86038 ANTINUCLEAR ANTIBODIES: CPT

## 2018-09-06 PROCEDURE — 87522 HEPATITIS C REVRS TRNSCRPJ: CPT

## 2018-09-06 PROCEDURE — 85610 PROTHROMBIN TIME: CPT

## 2018-09-06 PROCEDURE — 82104 ALPHA-1-ANTITRYPSIN PHENO: CPT

## 2018-09-06 PROCEDURE — 80053 COMPREHEN METABOLIC PANEL: CPT

## 2018-09-06 PROCEDURE — 82784 ASSAY IGA/IGD/IGG/IGM EACH: CPT

## 2018-09-07 ENCOUNTER — OFFICE VISIT (OUTPATIENT)
Dept: GASTROENTEROLOGY | Facility: CLINIC | Age: 23
End: 2018-09-07

## 2018-09-07 VITALS
SYSTOLIC BLOOD PRESSURE: 128 MMHG | WEIGHT: 131 LBS | HEART RATE: 101 BPM | OXYGEN SATURATION: 98 % | DIASTOLIC BLOOD PRESSURE: 80 MMHG | RESPIRATION RATE: 14 BRPM | BODY MASS INDEX: 18.75 KG/M2 | HEIGHT: 70 IN | TEMPERATURE: 98.3 F

## 2018-09-07 DIAGNOSIS — K70.10 ACUTE ALCOHOLIC HEPATITIS: Primary | ICD-10-CM

## 2018-09-07 LAB
ACTIN IGG SERPL-ACNC: 8 UNITS (ref 0–19)
ANA SER QL: NEGATIVE
HAV AB SER QL IA: POSITIVE
HBV CORE AB SER DONR QL IA: NEGATIVE
IGA SERPL-MCNC: 379 MG/DL (ref 87–352)
IGG SERPL-MCNC: 1231 MG/DL (ref 700–1600)
IGM SERPL-MCNC: 134 MG/DL (ref 26–217)

## 2018-09-07 PROCEDURE — 99212 OFFICE O/P EST SF 10 MIN: CPT | Performed by: INTERNAL MEDICINE

## 2018-09-07 NOTE — PROGRESS NOTES
PCP: Carlos Enrique Hook MD    No chief complaint on file.      History of Present Illness:   Melba Terry is a 23 y.o. female who presents to GI clinic as a follow up for alcoholic hepatitis. Since our last visit she has had intermittent social alcohol use. Has persistent scleral icteris but feels well. Completed steroids. No confusion or GIB loss. Maintaining wt and eating well.    Past Medical History:   Diagnosis Date   • Asthma    • Hepatitis     chemical        History reviewed. No pertinent surgical history.      Current Outpatient Prescriptions:   •  albuterol (PROVENTIL HFA;VENTOLIN HFA) 108 (90 Base) MCG/ACT inhaler, Inhale 2 puffs Every 4 (Four) Hours As Needed for Wheezing., Disp: , Rfl:   •  B Complex-Biotin-FA (B-100 B-COMPLEX) tablet, Take 1 tablet by mouth Daily., Disp: , Rfl:   •  gabapentin (NEURONTIN) 300 MG capsule, Take 600 mg by mouth 2 (Two) Times a Day. Pt taking 2 caps in the morning, 2 caps in the afternoon, and 3 caps at night., Disp: , Rfl:   •  gabapentin (NEURONTIN) 300 MG capsule, Take 900 mg by mouth Every Night. Pt taking 2 caps in the morning, 2 caps in the afternoon, and 3 caps at night., Disp: , Rfl:   •  ibuprofen (ADVIL,MOTRIN) 200 MG tablet, Take 200 mg by mouth As Needed for Mild Pain ., Disp: , Rfl:   •  Magnesium 500 MG capsule, Take 1 capsule by mouth Daily., Disp: , Rfl:   •  Multiple Vitamins-Minerals (MULTIVITAMIN ADULT) tablet, Take 1 tablet by mouth., Disp: , Rfl:   •  pentoxifylline (TRENtal) 400 MG CR tablet, Take 1 tablet by mouth 3 (Three) Times a Day With Meals., Disp: 90 tablet, Rfl: 0  •  prednisoLONE (ORAPRED) 15 MG/5ML solution, Take 10 mL by mouth See Admin Instructions., Disp: , Rfl:   •  predniSONE (DELTASONE) 5 MG tablet, Take 6tabs by mouth daily x4days, then 5tabs x4days, then 4tabs x4days, then 3tabs x4days, then 2tabs x4days, then 1tab x4days, then stop., Disp: 84 tablet, Rfl: 0  •  zinc sulfate (ZINCATE) 220 (50 Zn) MG capsule, Take 1 capsule by mouth  2 (Two) Times a Day., Disp: 60 capsule, Rfl: 2    No Known Allergies    Family History   Problem Relation Age of Onset   • Heart disease Mother    • Alcohol abuse Father    • Heart disease Father    • Drug abuse Sister        Social History     Social History   • Marital status: Single     Spouse name: N/A   • Number of children: N/A   • Years of education: N/A     Occupational History   • Not on file.     Social History Main Topics   • Smoking status: Never Smoker   • Smokeless tobacco: Never Used   • Alcohol use No      Comment: 1 pint at least daily (nothing since 07/29/2018)   • Drug use: No   • Sexual activity: Defer     Other Topics Concern   • Not on file     Social History Narrative   • No narrative on file       Review of Systems   All other systems reviewed and are negative.        Vitals:    09/07/18 1220   BP: 128/80   Pulse: 101   Resp: 14   Temp: 98.3 °F (36.8 °C)   SpO2: 98%       Physical Exam  General Appearance:  Vitals as above. no acute distress  Head/face:  Normocephalic, atraumatic  Eyes:   EOMI, scleral icteris  Nose/Sinuses:  Nares patent bilaterally without discharge or lesions  Mouth/Throat:  Posterior pharynx is pink without drainage or exudates;  dentition is in good condition and repair  Neck:  trachea is midline, no thyromegaly  Lungs:  Clear to auscultation bilaterally  Heart:  Regular rate and rhythm without murmur, gallop or rub  Abdomen:  Soft, non-tender to palpation, no obvious masses, bowel sounds positive in four quadrants; no abdominal bruits; no guarding or rebound tenderness  Neurologic:  Alert; no focal deficits; age appropriate behavior and speech  Psychiatric: mood and affect are congruent  Vascular: extremities without edema  Skin: no rash or cyanosis. No overt jaundice      Assessment/Plan  1.) History of Mod-Severe Alcoholic hepatitis, history of with steroid use   2.) Persistent alcohol use    Her persistent alcohol use is not only a problem for her, but I told her  explicitly that this very well may shorten her life expectancy much sooner than would be expected. I provided counseling and support and she will continue seeing a therapist. Her downgoing liver enzymes are not normal but are improved.  Would avoid NSAIDS.    rtc in 3 months with cmp      David Santiago MD  9/7/2018

## 2018-09-08 LAB — DEPRECATED MITOCHONDRIA M2 IGG SER-ACNC: <20 UNITS (ref 0–20)

## 2018-09-10 ENCOUNTER — TELEPHONE (OUTPATIENT)
Dept: GASTROENTEROLOGY | Facility: CLINIC | Age: 23
End: 2018-09-10

## 2018-09-10 LAB
HCV RNA SERPL NAA+PROBE-ACNC: NORMAL IU/ML
HFE GENE MUT ANL BLD/T: NORMAL
TEST INFORMATION: NORMAL

## 2018-09-10 NOTE — TELEPHONE ENCOUNTER
----- Message from David Santiago MD sent at 9/7/2018 11:00 AM EDT -----  Labs show improving but not normal liver enzymes as well as immunity to hepatitis A.

## 2018-09-12 ENCOUNTER — HOSPITAL ENCOUNTER (OUTPATIENT)
Dept: NUTRITION | Facility: HOSPITAL | Age: 23
Setting detail: RECURRING SERIES
Discharge: HOME | End: 2018-09-12
Attending: INTERNAL MEDICINE

## 2018-09-12 VITALS — BODY MASS INDEX: 18.75 KG/M2 | HEIGHT: 70 IN | WEIGHT: 131 LBS

## 2018-09-12 LAB
A1AT SERPL-MCNC: 222 MG/DL (ref 90–200)
PHENOTYPE: ABNORMAL

## 2018-09-12 PROCEDURE — 97802 MEDICAL NUTRITION INDIV IN: CPT | Performed by: DIETITIAN, REGISTERED

## 2018-10-10 ENCOUNTER — TELEPHONE (OUTPATIENT)
Dept: NUTRITION | Facility: HOSPITAL | Age: 23
End: 2018-10-10

## 2018-10-10 NOTE — PROGRESS NOTES
"RD called and spoke with patient on her progress since the initial nutrition session on 9/12. Patient reports things have been going pretty well, stating \"I am more mindful about what I eat\". Reports most days she is able to follow the schedule we created with 5-6 small meals per day, though sometimes it is fruit snacks or granola bar to eat. Patient states she has gained 4 pounds since starting this meal pattern (weight 131 pounds at initial appt) but is not concerned by it as she is more focused on working toward a healthy and balanced diet. Patient states she has started working again and voices some concerns about remembering to eat as before she would go days without eating. RD encouraged patient to continue having set times to eat and putting reminders on her phone. RD also suggested patient take food or fruit smoothie with her to work to sip on during her shift. Patient also says some of her coworkers are keeping her more accountable as well and reminding her to eat during her shift. Patient has no questions today.     Goal completion:  1. Eat every 3 hours (5-6 times/day): 100%  2. Maintain weight or gain weight: 100%              Self reported weight from MD appt 59.4kg (131)    Total of 15 min spent for telephone follow up. Patient states she is on her way to work and will call back later to reschedule her follow up appointment. She is encouraged to contact RD as needed. Thank you for this referral.   "

## 2019-03-28 ENCOUNTER — HOSPITAL ENCOUNTER (EMERGENCY)
Facility: HOSPITAL | Age: 24
Discharge: PSYCHIATRIC OR SUBSTANCE USE FACILITY | End: 2019-03-29
Attending: EMERGENCY MEDICINE | Admitting: EMERGENCY MEDICINE

## 2019-03-28 DIAGNOSIS — F10.20 ALCOHOLISM (HCC): Primary | ICD-10-CM

## 2019-03-28 LAB
6-ACETYL MORPHINE: NEGATIVE
ALBUMIN SERPL-MCNC: 3.45 G/DL (ref 3.5–5.2)
ALBUMIN/GLOB SERPL: 0.9 G/DL
ALP SERPL-CCNC: 216 U/L (ref 39–117)
ALT SERPL W P-5'-P-CCNC: 34 U/L (ref 1–33)
AMPHET+METHAMPHET UR QL: NEGATIVE
ANION GAP SERPL CALCULATED.3IONS-SCNC: 13.4 MMOL/L
APTT PPP: 44.7 SECONDS (ref 23.8–36.1)
AST SERPL-CCNC: 197 U/L (ref 1–32)
B-HCG UR QL: NEGATIVE
BARBITURATES UR QL SCN: NEGATIVE
BASOPHILS # BLD AUTO: 0.11 10*3/MM3 (ref 0–0.2)
BASOPHILS NFR BLD AUTO: 0.9 % (ref 0–1.5)
BENZODIAZ UR QL SCN: NEGATIVE
BILIRUB SERPL-MCNC: 5.3 MG/DL (ref 0.2–1.2)
BILIRUB UR QL STRIP: NEGATIVE
BUN BLD-MCNC: 3 MG/DL (ref 6–20)
BUN/CREAT SERPL: 6.8 (ref 7–25)
BUPRENORPHINE SERPL-MCNC: NEGATIVE NG/ML
CALCIUM SPEC-SCNC: 8.7 MG/DL (ref 8.6–10.5)
CANNABINOIDS SERPL QL: NEGATIVE
CHLORIDE SERPL-SCNC: 101 MMOL/L (ref 98–107)
CLARITY UR: CLEAR
CO2 SERPL-SCNC: 23.6 MMOL/L (ref 22–29)
COCAINE UR QL: NEGATIVE
COLOR UR: YELLOW
CREAT BLD-MCNC: 0.44 MG/DL (ref 0.57–1)
DEPRECATED RDW RBC AUTO: 56.6 FL (ref 37–54)
EOSINOPHIL # BLD AUTO: 0.17 10*3/MM3 (ref 0–0.4)
EOSINOPHIL NFR BLD AUTO: 1.4 % (ref 0.3–6.2)
ERYTHROCYTE [DISTWIDTH] IN BLOOD BY AUTOMATED COUNT: 14.6 % (ref 12.3–15.4)
ETHANOL BLD-MCNC: 216 MG/DL (ref 0–10)
ETHANOL BLD-MCNC: 281 MG/DL (ref 0–10)
ETHANOL UR QL: 0.22 %
ETHANOL UR QL: 0.28 %
GFR SERPL CREATININE-BSD FRML MDRD: >150 ML/MIN/1.73
GLOBULIN UR ELPH-MCNC: 4 GM/DL
GLUCOSE BLD-MCNC: 103 MG/DL (ref 65–99)
GLUCOSE UR STRIP-MCNC: NEGATIVE MG/DL
HAV IGM SERPL QL IA: NORMAL
HBV CORE IGM SERPL QL IA: NORMAL
HBV SURFACE AG SERPL QL IA: NORMAL
HCT VFR BLD AUTO: 30.3 % (ref 34–46.6)
HCV AB SER DONR QL: NORMAL
HGB BLD-MCNC: 10.1 G/DL (ref 12–15.9)
HGB UR QL STRIP.AUTO: NEGATIVE
IMM GRANULOCYTES # BLD AUTO: 0.03 10*3/MM3 (ref 0–0.05)
IMM GRANULOCYTES NFR BLD AUTO: 0.2 % (ref 0–0.5)
INR PPP: 1.94 (ref 0.9–1.1)
KETONES UR QL STRIP: NEGATIVE
LEUKOCYTE ESTERASE UR QL STRIP.AUTO: NEGATIVE
LYMPHOCYTES # BLD AUTO: 2.76 10*3/MM3 (ref 0.7–3.1)
LYMPHOCYTES NFR BLD AUTO: 22.6 % (ref 19.6–45.3)
MACROCYTES BLD QL SMEAR: NORMAL
MAGNESIUM SERPL-MCNC: 1.5 MG/DL (ref 1.6–2.6)
MCH RBC QN AUTO: 36.2 PG (ref 26.6–33)
MCHC RBC AUTO-ENTMCNC: 33.3 G/DL (ref 31.5–35.7)
MCV RBC AUTO: 108.6 FL (ref 79–97)
METHADONE UR QL SCN: NEGATIVE
MONOCYTES # BLD AUTO: 1.56 10*3/MM3 (ref 0.1–0.9)
MONOCYTES NFR BLD AUTO: 12.8 % (ref 5–12)
NEUTROPHILS # BLD AUTO: 7.6 10*3/MM3 (ref 1.4–7)
NEUTROPHILS NFR BLD AUTO: 62.1 % (ref 42.7–76)
NITRITE UR QL STRIP: NEGATIVE
OPIATES UR QL: NEGATIVE
OXYCODONE UR QL SCN: NEGATIVE
PCP UR QL SCN: NEGATIVE
PH UR STRIP.AUTO: 6.5 [PH] (ref 5–8)
PLATELET # BLD AUTO: 73 10*3/MM3 (ref 140–450)
PMV BLD AUTO: 10.6 FL (ref 6–12)
POTASSIUM BLD-SCNC: 3.7 MMOL/L (ref 3.5–5.2)
PROT SERPL-MCNC: 7.4 G/DL (ref 6–8.5)
PROT UR QL STRIP: NEGATIVE
PROTHROMBIN TIME: 22.4 SECONDS (ref 11–15.4)
RBC # BLD AUTO: 2.79 10*6/MM3 (ref 3.77–5.28)
SMALL PLATELETS BLD QL SMEAR: NORMAL
SODIUM BLD-SCNC: 138 MMOL/L (ref 136–145)
SP GR UR STRIP: 1.01 (ref 1–1.03)
UROBILINOGEN UR QL STRIP: NORMAL
WBC NRBC COR # BLD: 12.23 10*3/MM3 (ref 3.4–10.8)

## 2019-03-28 PROCEDURE — 80307 DRUG TEST PRSMV CHEM ANLYZR: CPT | Performed by: EMERGENCY MEDICINE

## 2019-03-28 PROCEDURE — 96365 THER/PROPH/DIAG IV INF INIT: CPT

## 2019-03-28 PROCEDURE — 36415 COLL VENOUS BLD VENIPUNCTURE: CPT

## 2019-03-28 PROCEDURE — 85025 COMPLETE CBC W/AUTO DIFF WBC: CPT | Performed by: EMERGENCY MEDICINE

## 2019-03-28 PROCEDURE — 25010000002 MAGNESIUM SULFATE PER 500 MG OF MAGNESIUM: Performed by: EMERGENCY MEDICINE

## 2019-03-28 PROCEDURE — 81003 URINALYSIS AUTO W/O SCOPE: CPT | Performed by: EMERGENCY MEDICINE

## 2019-03-28 PROCEDURE — 80053 COMPREHEN METABOLIC PANEL: CPT | Performed by: EMERGENCY MEDICINE

## 2019-03-28 PROCEDURE — 85007 BL SMEAR W/DIFF WBC COUNT: CPT | Performed by: EMERGENCY MEDICINE

## 2019-03-28 PROCEDURE — 83735 ASSAY OF MAGNESIUM: CPT | Performed by: EMERGENCY MEDICINE

## 2019-03-28 PROCEDURE — 81025 URINE PREGNANCY TEST: CPT | Performed by: EMERGENCY MEDICINE

## 2019-03-28 PROCEDURE — 96366 THER/PROPH/DIAG IV INF ADDON: CPT

## 2019-03-28 PROCEDURE — 85610 PROTHROMBIN TIME: CPT | Performed by: EMERGENCY MEDICINE

## 2019-03-28 PROCEDURE — 25010000002 THIAMINE PER 100 MG: Performed by: EMERGENCY MEDICINE

## 2019-03-28 PROCEDURE — 99285 EMERGENCY DEPT VISIT HI MDM: CPT

## 2019-03-28 PROCEDURE — 80074 ACUTE HEPATITIS PANEL: CPT | Performed by: EMERGENCY MEDICINE

## 2019-03-28 PROCEDURE — 85730 THROMBOPLASTIN TIME PARTIAL: CPT | Performed by: EMERGENCY MEDICINE

## 2019-03-28 RX ORDER — SODIUM CHLORIDE 0.9 % (FLUSH) 0.9 %
10 SYRINGE (ML) INJECTION AS NEEDED
Status: DISCONTINUED | OUTPATIENT
Start: 2019-03-28 | End: 2019-03-29 | Stop reason: HOSPADM

## 2019-03-28 RX ADMIN — THIAMINE HYDROCHLORIDE 1000 ML/HR: 100 INJECTION, SOLUTION INTRAMUSCULAR; INTRAVENOUS at 20:01

## 2019-03-29 ENCOUNTER — HOSPITAL ENCOUNTER (INPATIENT)
Facility: HOSPITAL | Age: 24
LOS: 5 days | Discharge: HOME | End: 2019-04-03
Attending: PSYCHIATRY & NEUROLOGY | Admitting: PSYCHIATRY & NEUROLOGY

## 2019-03-29 VITALS
DIASTOLIC BLOOD PRESSURE: 90 MMHG | HEIGHT: 70 IN | SYSTOLIC BLOOD PRESSURE: 128 MMHG | RESPIRATION RATE: 16 BRPM | WEIGHT: 136 LBS | OXYGEN SATURATION: 99 % | TEMPERATURE: 98.1 F | HEART RATE: 81 BPM | BODY MASS INDEX: 19.47 KG/M2

## 2019-03-29 PROBLEM — F41.9 ANXIETY: Status: ACTIVE | Noted: 2019-03-29

## 2019-03-29 PROBLEM — F32.A DEPRESSION: Status: ACTIVE | Noted: 2019-03-29

## 2019-03-29 PROBLEM — F10.10 ALCOHOL ABUSE: Status: ACTIVE | Noted: 2019-03-29

## 2019-03-29 PROBLEM — L40.9 PSORIASIS: Status: ACTIVE | Noted: 2019-03-29

## 2019-03-29 LAB
ETHANOL BLD-MCNC: 131 MG/DL (ref 0–10)
ETHANOL UR QL: 0.13 %
MAGNESIUM SERPL-MCNC: 1.6 MG/DL (ref 1.6–2.6)

## 2019-03-29 PROCEDURE — 99223 1ST HOSP IP/OBS HIGH 75: CPT | Performed by: PSYCHIATRY & NEUROLOGY

## 2019-03-29 PROCEDURE — 80307 DRUG TEST PRSMV CHEM ANLYZR: CPT | Performed by: NURSE PRACTITIONER

## 2019-03-29 PROCEDURE — 83735 ASSAY OF MAGNESIUM: CPT | Performed by: PSYCHIATRY & NEUROLOGY

## 2019-03-29 PROCEDURE — 93010 ELECTROCARDIOGRAM REPORT: CPT | Performed by: INTERNAL MEDICINE

## 2019-03-29 PROCEDURE — 93005 ELECTROCARDIOGRAM TRACING: CPT | Performed by: PSYCHIATRY & NEUROLOGY

## 2019-03-29 PROCEDURE — HZ2ZZZZ DETOXIFICATION SERVICES FOR SUBSTANCE ABUSE TREATMENT: ICD-10-PCS | Performed by: PSYCHIATRY & NEUROLOGY

## 2019-03-29 RX ORDER — LORAZEPAM 1 MG/1
1 TABLET ORAL
Status: COMPLETED | OUTPATIENT
Start: 2019-03-31 | End: 2019-03-31

## 2019-03-29 RX ORDER — BENZONATATE 100 MG/1
100 CAPSULE ORAL 3 TIMES DAILY PRN
Status: DISCONTINUED | OUTPATIENT
Start: 2019-03-29 | End: 2019-04-03 | Stop reason: HOSPADM

## 2019-03-29 RX ORDER — ALUMINA, MAGNESIA, AND SIMETHICONE 2400; 2400; 240 MG/30ML; MG/30ML; MG/30ML
15 SUSPENSION ORAL EVERY 6 HOURS PRN
Status: DISCONTINUED | OUTPATIENT
Start: 2019-03-29 | End: 2019-04-03 | Stop reason: HOSPADM

## 2019-03-29 RX ORDER — ONDANSETRON 4 MG/1
4 TABLET, FILM COATED ORAL EVERY 6 HOURS PRN
Status: DISCONTINUED | OUTPATIENT
Start: 2019-03-29 | End: 2019-04-03 | Stop reason: HOSPADM

## 2019-03-29 RX ORDER — ECHINACEA PURPUREA EXTRACT 125 MG
2 TABLET ORAL AS NEEDED
Status: DISCONTINUED | OUTPATIENT
Start: 2019-03-29 | End: 2019-04-03 | Stop reason: HOSPADM

## 2019-03-29 RX ORDER — FOLIC ACID 1 MG/1
1 TABLET ORAL DAILY
Status: DISCONTINUED | OUTPATIENT
Start: 2019-03-29 | End: 2019-04-03 | Stop reason: HOSPADM

## 2019-03-29 RX ORDER — HYDROXYZINE 50 MG/1
50 TABLET, FILM COATED ORAL EVERY 6 HOURS PRN
Status: DISCONTINUED | OUTPATIENT
Start: 2019-03-29 | End: 2019-04-03 | Stop reason: HOSPADM

## 2019-03-29 RX ORDER — ACETAMINOPHEN 160 MG/5ML
650 SOLUTION ORAL EVERY 4 HOURS PRN
Status: DISCONTINUED | OUTPATIENT
Start: 2019-03-29 | End: 2019-04-03 | Stop reason: HOSPADM

## 2019-03-29 RX ORDER — LORAZEPAM 0.5 MG/1
0.5 TABLET ORAL
Status: COMPLETED | OUTPATIENT
Start: 2019-04-01 | End: 2019-04-01

## 2019-03-29 RX ORDER — LORAZEPAM 0.5 MG/1
0.5 TABLET ORAL EVERY 4 HOURS PRN
Status: ACTIVE | OUTPATIENT
Start: 2019-04-02 | End: 2019-04-03

## 2019-03-29 RX ORDER — LOPERAMIDE HYDROCHLORIDE 2 MG/1
2 CAPSULE ORAL 4 TIMES DAILY PRN
Status: DISCONTINUED | OUTPATIENT
Start: 2019-03-29 | End: 2019-04-03 | Stop reason: HOSPADM

## 2019-03-29 RX ORDER — BENZTROPINE MESYLATE 1 MG/ML
0.5 INJECTION INTRAMUSCULAR; INTRAVENOUS DAILY PRN
Status: DISCONTINUED | OUTPATIENT
Start: 2019-03-29 | End: 2019-04-03 | Stop reason: HOSPADM

## 2019-03-29 RX ORDER — LORAZEPAM 2 MG/1
2 TABLET ORAL
Status: DISPENSED | OUTPATIENT
Start: 2019-03-29 | End: 2019-03-30

## 2019-03-29 RX ORDER — FAMOTIDINE 20 MG/1
20 TABLET, FILM COATED ORAL 2 TIMES DAILY PRN
Status: DISCONTINUED | OUTPATIENT
Start: 2019-03-29 | End: 2019-04-03 | Stop reason: HOSPADM

## 2019-03-29 RX ORDER — BENZTROPINE MESYLATE 1 MG/1
1 TABLET ORAL DAILY PRN
Status: DISCONTINUED | OUTPATIENT
Start: 2019-03-29 | End: 2019-04-03 | Stop reason: HOSPADM

## 2019-03-29 RX ORDER — LORAZEPAM 1 MG/1
1 TABLET ORAL EVERY 4 HOURS PRN
Status: ACTIVE | OUTPATIENT
Start: 2019-04-01 | End: 2019-04-02

## 2019-03-29 RX ORDER — THIAMINE MONONITRATE (VIT B1) 100 MG
100 TABLET ORAL DAILY
Status: DISCONTINUED | OUTPATIENT
Start: 2019-03-29 | End: 2019-04-03 | Stop reason: HOSPADM

## 2019-03-29 RX ORDER — MULTIVITAMIN
1 TABLET ORAL DAILY
Status: DISCONTINUED | OUTPATIENT
Start: 2019-03-29 | End: 2019-04-03 | Stop reason: HOSPADM

## 2019-03-29 RX ORDER — LORAZEPAM 2 MG/1
2 TABLET ORAL EVERY 4 HOURS PRN
Status: ACTIVE | OUTPATIENT
Start: 2019-03-30 | End: 2019-03-31

## 2019-03-29 RX ORDER — TRAZODONE HYDROCHLORIDE 50 MG/1
50 TABLET ORAL NIGHTLY PRN
Status: DISCONTINUED | OUTPATIENT
Start: 2019-03-29 | End: 2019-04-03 | Stop reason: HOSPADM

## 2019-03-29 RX ORDER — LORAZEPAM 2 MG/1
2 TABLET ORAL
Status: COMPLETED | OUTPATIENT
Start: 2019-03-29 | End: 2019-03-29

## 2019-03-29 RX ADMIN — Medication 1 TABLET: at 08:50

## 2019-03-29 RX ADMIN — LORAZEPAM 2 MG: 2 TABLET ORAL at 14:32

## 2019-03-29 RX ADMIN — Medication 100 MG: at 08:50

## 2019-03-29 RX ADMIN — LORAZEPAM 2 MG: 2 TABLET ORAL at 04:07

## 2019-03-29 RX ADMIN — FOLIC ACID 1 MG: 1 TABLET ORAL at 14:31

## 2019-03-29 RX ADMIN — LORAZEPAM 2 MG: 2 TABLET ORAL at 08:51

## 2019-03-29 RX ADMIN — LORAZEPAM 2 MG: 2 TABLET ORAL at 21:03

## 2019-03-29 RX ADMIN — HYDROXYZINE HYDROCHLORIDE 50 MG: 50 TABLET, FILM COATED ORAL at 04:06

## 2019-03-29 RX ADMIN — TRAZODONE HYDROCHLORIDE 50 MG: 50 TABLET ORAL at 04:06

## 2019-03-30 PROCEDURE — 99232 SBSQ HOSP IP/OBS MODERATE 35: CPT | Performed by: PSYCHIATRY & NEUROLOGY

## 2019-03-30 RX ADMIN — Medication 100 MG: at 08:36

## 2019-03-30 RX ADMIN — FOLIC ACID 1 MG: 1 TABLET ORAL at 08:36

## 2019-03-30 RX ADMIN — LORAZEPAM 1.5 MG: 1 TABLET ORAL at 08:37

## 2019-03-30 RX ADMIN — LORAZEPAM 1.5 MG: 1 TABLET ORAL at 14:25

## 2019-03-30 RX ADMIN — Medication 1 TABLET: at 08:36

## 2019-03-30 RX ADMIN — TRAZODONE HYDROCHLORIDE 50 MG: 50 TABLET ORAL at 21:28

## 2019-03-30 RX ADMIN — LORAZEPAM 1.5 MG: 1 TABLET ORAL at 21:28

## 2019-03-31 PROCEDURE — 99232 SBSQ HOSP IP/OBS MODERATE 35: CPT | Performed by: PSYCHIATRY & NEUROLOGY

## 2019-03-31 RX ORDER — CETIRIZINE HYDROCHLORIDE 10 MG/1
10 TABLET ORAL NIGHTLY
Status: DISCONTINUED | OUTPATIENT
Start: 2019-03-31 | End: 2019-04-03 | Stop reason: HOSPADM

## 2019-03-31 RX ORDER — CETIRIZINE HYDROCHLORIDE 10 MG/1
10 TABLET ORAL DAILY
Status: DISCONTINUED | OUTPATIENT
Start: 2019-03-31 | End: 2019-03-31

## 2019-03-31 RX ADMIN — FOLIC ACID 1 MG: 1 TABLET ORAL at 08:37

## 2019-03-31 RX ADMIN — TRAZODONE HYDROCHLORIDE 50 MG: 50 TABLET ORAL at 21:13

## 2019-03-31 RX ADMIN — Medication 100 MG: at 08:37

## 2019-03-31 RX ADMIN — Medication 1 TABLET: at 08:37

## 2019-03-31 RX ADMIN — LORAZEPAM 1 MG: 1 TABLET ORAL at 15:51

## 2019-03-31 RX ADMIN — CETIRIZINE HCL 10 MG: 10 TABLET ORAL at 21:13

## 2019-03-31 RX ADMIN — LORAZEPAM 1 MG: 1 TABLET ORAL at 08:37

## 2019-03-31 RX ADMIN — LORAZEPAM 1 MG: 1 TABLET ORAL at 21:13

## 2019-04-01 LAB
BASOPHILS # BLD AUTO: 0.03 10*3/MM3 (ref 0–0.2)
BASOPHILS NFR BLD AUTO: 0.4 % (ref 0–1.5)
DEPRECATED RDW RBC AUTO: 56.1 FL (ref 37–54)
EOSINOPHIL # BLD AUTO: 0.09 10*3/MM3 (ref 0–0.4)
EOSINOPHIL NFR BLD AUTO: 1.3 % (ref 0.3–6.2)
ERYTHROCYTE [DISTWIDTH] IN BLOOD BY AUTOMATED COUNT: 14.3 % (ref 12.3–15.4)
HCT VFR BLD AUTO: 28.1 % (ref 34–46.6)
HGB BLD-MCNC: 9.2 G/DL (ref 12–15.9)
IMM GRANULOCYTES # BLD AUTO: 0.02 10*3/MM3 (ref 0–0.05)
IMM GRANULOCYTES NFR BLD AUTO: 0.3 % (ref 0–0.5)
LYMPHOCYTES # BLD AUTO: 1.4 10*3/MM3 (ref 0.7–3.1)
LYMPHOCYTES NFR BLD AUTO: 19.8 % (ref 19.6–45.3)
MACROCYTES BLD QL SMEAR: NORMAL
MCH RBC QN AUTO: 36.4 PG (ref 26.6–33)
MCHC RBC AUTO-ENTMCNC: 32.7 G/DL (ref 31.5–35.7)
MCV RBC AUTO: 111.1 FL (ref 79–97)
MONOCYTES # BLD AUTO: 0.76 10*3/MM3 (ref 0.1–0.9)
MONOCYTES NFR BLD AUTO: 10.8 % (ref 5–12)
NEUTROPHILS # BLD AUTO: 4.76 10*3/MM3 (ref 1.4–7)
NEUTROPHILS NFR BLD AUTO: 67.4 % (ref 42.7–76)
PLATELET # BLD AUTO: 58 10*3/MM3 (ref 140–450)
PMV BLD AUTO: 10.7 FL (ref 6–12)
RBC # BLD AUTO: 2.53 10*6/MM3 (ref 3.77–5.28)
SMALL PLATELETS BLD QL SMEAR: NORMAL
WBC NRBC COR # BLD: 7.06 10*3/MM3 (ref 3.4–10.8)

## 2019-04-01 PROCEDURE — 85007 BL SMEAR W/DIFF WBC COUNT: CPT | Performed by: PSYCHIATRY & NEUROLOGY

## 2019-04-01 PROCEDURE — 85025 COMPLETE CBC W/AUTO DIFF WBC: CPT | Performed by: PSYCHIATRY & NEUROLOGY

## 2019-04-01 PROCEDURE — 99232 SBSQ HOSP IP/OBS MODERATE 35: CPT | Performed by: PSYCHIATRY & NEUROLOGY

## 2019-04-01 RX ADMIN — Medication 100 MG: at 08:22

## 2019-04-01 RX ADMIN — HYDROXYZINE HYDROCHLORIDE 50 MG: 50 TABLET, FILM COATED ORAL at 21:21

## 2019-04-01 RX ADMIN — LORAZEPAM 0.5 MG: 0.5 TABLET ORAL at 08:22

## 2019-04-01 RX ADMIN — FOLIC ACID 1 MG: 1 TABLET ORAL at 08:22

## 2019-04-01 RX ADMIN — Medication 1 TABLET: at 08:22

## 2019-04-01 RX ADMIN — LORAZEPAM 0.5 MG: 0.5 TABLET ORAL at 21:21

## 2019-04-01 RX ADMIN — TRAZODONE HYDROCHLORIDE 50 MG: 50 TABLET ORAL at 21:21

## 2019-04-01 RX ADMIN — CETIRIZINE HCL 10 MG: 10 TABLET ORAL at 21:21

## 2019-04-01 RX ADMIN — LORAZEPAM 0.5 MG: 0.5 TABLET ORAL at 15:14

## 2019-04-01 NOTE — PROGRESS NOTES
"INPATIENT PSYCHIATRIC PROGRESS NOTE    Name:  Melba Terry  :  1995  MRN:  [id number]  Visit Number:  [id number]  Length of stay:  3    SUBJECTIVE  CC/Focus of Exam: withdrawals    INTERVAL HISTORY:  The patient reports she is feeling some better.  Depression rating 7/10  Anxiety rating 710  Sleep: good  Withdrawal sx: denies  Cravin/10    Review of Systems   Constitutional: Negative.    Respiratory: Negative.    Cardiovascular: Negative.    Gastrointestinal: Negative.        OBJECTIVE    Temp:  [97.4 °F (36.3 °C)-98.1 °F (36.7 °C)] 98.1 °F (36.7 °C)  Heart Rate:  [] 101  Resp:  [18] 18  BP: (101-117)/(59-75) 101/59    MENTAL STATUS EXAM:  Appearance:Casually dressed, good hygeine.   Cooperation:Cooperative  Psychomotor: No psychomotor agitation/retardation, No EPS, No motor tics  Speech-normal rate, amount.  Mood \"depressed and anxious\"   Affect-congruent, appropriate, stable  Thought Content-goal directed, no delusional material present  Thought process-linear, organized.  Suicidality: No SI  Homicidality: No HI  Perception: No AH/VH  Insight-fair   Judgement-fair    Lab Results (last 24 hours)     ** No results found for the last 24 hours. **             Imaging Results (last 24 hours)     ** No results found for the last 24 hours. **             ECG/EMG Results (most recent)     Procedure Component Value Units Date/Time    ECG 12 Lead [[id number]] Collected:  19 0738     Updated:  19 1828    Narrative:       Test Reason : Potential adverse reaction to medications.  Blood Pressure : **/** mmHG  Vent. Rate : 103 BPM     Atrial Rate : 103 BPM     P-R Int : 138 ms          QRS Dur : 080 ms      QT Int : 372 ms       P-R-T Axes : 038 039 013 degrees     QTc Int : 487 ms    Sinus tachycardia  Nonspecific T wave abnormality  Abnormal ECG  No previous ECGs available  Confirmed by Geraldo Bentley () on 3/30/2019 6:28:20 PM    Referred By:  ROBERT           Confirmed By:Geraldo " Subramaniyam           ALLERGIES: Patient has no known allergies.      Current Facility-Administered Medications:   •  acetaminophen (TYLENOL) 160 MG/5ML solution 650 mg, 650 mg, Oral, Q4H PRN, Yves Vance MD  •  aluminum-magnesium hydroxide-simethicone (MAALOX MAX) 400-400-40 MG/5ML suspension 15 mL, 15 mL, Oral, Q6H PRN, Yves Vance MD  •  benzonatate (TESSALON) capsule 100 mg, 100 mg, Oral, TID PRN, Yves Vance MD  •  benztropine (COGENTIN) tablet 1 mg, 1 mg, Oral, Daily PRN **OR** benztropine (COGENTIN) injection 0.5 mg, 0.5 mg, Intramuscular, Daily PRN, Yves Vance MD  •  cetirizine (zyrTEC) tablet 10 mg, 10 mg, Oral, Nightly, Yves Vance MD, 10 mg at 19  •  famotidine (PEPCID) tablet 20 mg, 20 mg, Oral, BID PRN, Yves Vance MD  •  folic acid (FOLVITE) tablet 1 mg, 1 mg, Oral, Daily, Sukh Ferris MD, 1 mg at 19 0822  •  hydrOXYzine (ATARAX) tablet 50 mg, 50 mg, Oral, Q6H PRN, Yves Vance MD, 50 mg at 19 0406  •  loperamide (IMODIUM) capsule 2 mg, 2 mg, Oral, 4x Daily PRN, Yves Vance MD  •  [COMPLETED] LORazepam (ATIVAN) tablet 2 mg, 2 mg, Oral, 3 times per day, 2 mg at 19 **FOLLOWED BY** [COMPLETED] LORazepam (ATIVAN) tablet 1.5 mg, 1.5 mg, Oral, 3 times per day, 1.5 mg at 19 **FOLLOWED BY** [COMPLETED] LORazepam (ATIVAN) tablet 1 mg, 1 mg, Oral, 3 times per day, 1 mg at 19 **FOLLOWED BY** LORazepam (ATIVAN) tablet 0.5 mg, 0.5 mg, Oral, 3 times per day, Yves Vance MD, 0.5 mg at 19 0822  •  [] LORazepam (ATIVAN) tablet 2 mg, 2 mg, Oral, Q4H PRN **FOLLOWED BY** [] LORazepam (ATIVAN) tablet 1.5 mg, 1.5 mg, Oral, Q4H PRN **FOLLOWED BY** LORazepam (ATIVAN) tablet 1 mg, 1 mg, Oral, Q4H PRN **FOLLOWED BY** [START ON 2019] LORazepam (ATIVAN) tablet 0.5 mg, 0.5 mg, Oral, Q4H PRN, Yves Vance MD  •  magnesium hydroxide (MILK OF MAGNESIA) suspension 2400 mg/10mL 10 mL, 10 mL,  Oral, Daily PRN, Yves Vance MD  •  multivitamin (DAILY KINJAL) tablet 1 tablet, 1 tablet, Oral, Daily, Yves Vance MD, 1 tablet at 04/01/19 0822  •  ondansetron (ZOFRAN) tablet 4 mg, 4 mg, Oral, Q6H PRN, Yves Vance MD  •  sodium chloride (OCEAN) nasal spray 2 spray, 2 spray, Each Nare, PRN, Yves Vance MD  •  thiamine (VITAMIN B-1) tablet 100 mg, 100 mg, Oral, Daily, Yves Vance MD, 100 mg at 04/01/19 0822  •  traZODone (DESYREL) tablet 50 mg, 50 mg, Oral, Nightly PRN, Yves Vance MD, 50 mg at 03/31/19 2113    ASSESSMENT & PLAN:    Alcohol withdrawal syndrome with complication (CMS/HCC)  - Ativan detox       Acute alcoholic hepatitis  - Supportive treatment       Thrombocytopenia (CMS/HCC)  - Supportive treatment       Depression  - Continue Lexapro 10 mg daily       Anxiety  - Continue Lexapro 10 mg daily  - Hydroxyzine prn       Psoriasis  - Pt may use her home topical medication      Suicide precautions: None    Behavioral Health Treatment Plan and Problem List: I have reviewed and approved the Behavioral Health Treatment Plan and Problem list.  The patient has had a chance to review and agrees with the treatment plan.     Clinician:  Sukh Ferris MD  04/01/19  12:15 PM

## 2019-04-01 NOTE — PROGRESS NOTES
[id number]  D: Met with the patient in the office, assessing her needs and discussing aftercare plans. The patient was agreeable. The patient complained primarily of fatigue today. She reported having been in her room much of the day, resting. She planned to be admitted to [facility] on Wednesday.     A: The patient's affect appeared depressed. She seemed motivated. She seemed primarily interested in resting at this time.     P: The patient will follow up with residential treatment at [facility].

## 2019-04-01 NOTE — PLAN OF CARE
"Problem: Patient Care Overview  Goal: Plan of Care Review  Outcome: Ongoing (interventions implemented as appropriate)   04/01/19 0431   Coping/Psychosocial   Plan of Care Reviewed With patient   Coping/Psychosocial   Patient Agreement with Plan of Care agrees   Plan of Care Review   Progress no change   OTHER   Outcome Summary Patient reports appetite as fair, sleeping good; anxiety as 6, depression as 7; report having \"flashbacks of things that have happened in my past.\" ; denies any hallucinations; reports feeling helpless, hopless and worthless, CIWA during shift assessment 3.       Problem: Overarching Goals (Adult)  Goal: Adheres to Safety Considerations for Self and Others  Outcome: Ongoing (interventions implemented as appropriate)    Goal: Optimized Coping Skills in Response to Life Stressors  Outcome: Ongoing (interventions implemented as appropriate)    Goal: Develops/Participates in Therapeutic [place] to Support Successful Transition  Outcome: Ongoing (interventions implemented as appropriate)        "

## 2019-04-01 NOTE — PLAN OF CARE
Problem: Patient Care Overview  Goal: Plan of Care Review  Outcome: Ongoing (interventions implemented as appropriate)   04/01/19 4129   Coping/Psychosocial   Plan of Care Reviewed With patient   Coping/Psychosocial   Patient Agreement with Plan of Care agrees   Plan of Care Review   Progress no change   OTHER   Outcome Summary Patient isolates self in room.

## 2019-04-02 PROCEDURE — 99232 SBSQ HOSP IP/OBS MODERATE 35: CPT | Performed by: PSYCHIATRY & NEUROLOGY

## 2019-04-02 RX ORDER — ESCITALOPRAM OXALATE 10 MG/1
10 TABLET ORAL DAILY
Status: DISCONTINUED | OUTPATIENT
Start: 2019-04-02 | End: 2019-04-03 | Stop reason: HOSPADM

## 2019-04-02 RX ADMIN — Medication 1 TABLET: at 08:11

## 2019-04-02 RX ADMIN — HYDROXYZINE HYDROCHLORIDE 50 MG: 50 TABLET, FILM COATED ORAL at 08:12

## 2019-04-02 RX ADMIN — Medication 100 MG: at 08:11

## 2019-04-02 RX ADMIN — FOLIC ACID 1 MG: 1 TABLET ORAL at 08:11

## 2019-04-02 RX ADMIN — CETIRIZINE HCL 10 MG: 10 TABLET ORAL at 21:03

## 2019-04-02 RX ADMIN — ESCITALOPRAM OXALATE 10 MG: 10 TABLET ORAL at 14:12

## 2019-04-02 RX ADMIN — TRAZODONE HYDROCHLORIDE 50 MG: 50 TABLET ORAL at 21:03

## 2019-04-02 NOTE — PLAN OF CARE
Problem: Patient Care Overview  Goal: Plan of Care Review  Outcome: Ongoing (interventions implemented as appropriate)   04/02/19 1846   Coping/Psychosocial   Plan of Care Reviewed With patient   Coping/Psychosocial   Patient Agreement with Plan of Care agrees   Plan of Care Review   Progress improving       Problem: Overarching Goals (Adult)  Goal: Adheres to Safety Considerations for Self and Others  Outcome: Ongoing (interventions implemented as appropriate)   04/02/19 1846   Overarching Goals (Adult)   Adheres to Safety Considerations for Self and Others making progress toward outcome     Goal: Optimized Coping Skills in Response to Life Stressors  Outcome: Ongoing (interventions implemented as appropriate)   04/02/19 1846   Overarching Goals (Adult)   Optimized Coping Skills in Response to Life Stressors making progress toward outcome     Goal: Develops/Participates in Therapeutic [place] to Support Successful Transition  Outcome: Ongoing (interventions implemented as appropriate)   04/02/19 1846   Overarching Goals (Adult)   Develops/Participates in Therapeutic [place] to Support Successful Transition making progress toward outcome

## 2019-04-02 NOTE — PROGRESS NOTES
"INPATIENT PSYCHIATRIC PROGRESS NOTE    Name:  Melba Terry  :  1995  MRN:  [id number]  Visit Number:  [id number]  Length of stay:  4    SUBJECTIVE  CC/Focus of Exam: withdrawals    INTERVAL HISTORY:  The patient reports she is feeling depressed. She would like to start Lexapro, the plan was to restart it but has not been started yet and will be added to the regimen today. She agreed to a discharge tomorrow.  Depression rating 8/10  Anxiety rating 0/10  Sleep: good  Withdrawal sx: denies  Cravin/10    Review of Systems   Constitutional: Negative.    Respiratory: Negative.    Cardiovascular: Negative.    Gastrointestinal: Negative.    Skin: Positive for rash.       OBJECTIVE    Temp:  [97.6 °F (36.4 °C)-97.9 °F (36.6 °C)] 97.7 °F (36.5 °C)  Heart Rate:  [] 103  Resp:  [16-18] 18  BP: ()/(61-83) 139/81    MENTAL STATUS EXAM:  Appearance:Casually dressed, good hygeine.   Cooperation:Cooperative  Psychomotor: No psychomotor agitation/retardation, No EPS, No motor tics  Speech-normal rate, amount.  Mood \"depressed and anxious\"   Affect-congruent, appropriate, stable  Thought Content-goal directed, no delusional material present  Thought process-linear, organized.  Suicidality: No SI  Homicidality: No HI  Perception: No AH/VH  Insight-fair   Judgement-fair    Lab Results (last 24 hours)     Procedure Component Value Units Date/Time    CBC & Differential [[id number]] Collected:  19 173    Specimen:  Blood Updated:  19 1853    Narrative:       The following orders were created for panel order CBC & Differential.  Procedure                               Abnormality         Status                     ---------                               -----------         ------                     Scan Slide[[id number]]                                       Final result               CBC Auto Differential[[id number]]        Abnormal            Final result                 Please view results for these " tests on the individual orders.    CBC Auto Differential [[id number]]  (Abnormal) Collected:  04/01/19 1739    Specimen:  Blood Updated:  04/01/19 1853     WBC 7.06 10*3/mm3      RBC 2.53 10*6/mm3      Hemoglobin 9.2 g/dL      Hematocrit 28.1 %      .1 fL      MCH 36.4 pg      MCHC 32.7 g/dL      RDW 14.3 %      RDW-SD 56.1 fl      MPV 10.7 fL      Platelets 58 10*3/mm3      Neutrophil % 67.4 %      Lymphocyte % 19.8 %      Monocyte % 10.8 %      Eosinophil % 1.3 %      Basophil % 0.4 %      Immature Grans % 0.3 %      Neutrophils, Absolute 4.76 10*3/mm3      Lymphocytes, Absolute 1.40 10*3/mm3      Monocytes, Absolute 0.76 10*3/mm3      Eosinophils, Absolute 0.09 10*3/mm3      Basophils, Absolute 0.03 10*3/mm3      Immature Grans, Absolute 0.02 10*3/mm3     Scan Slide [[id number]] Collected:  04/01/19 1739    Specimen:  Blood Updated:  04/01/19 1853     Macrocytes Large/3+     Platelet Estimate Decreased             Imaging Results (last 24 hours)     ** No results found for the last 24 hours. **             ECG/EMG Results (most recent)     Procedure Component Value Units Date/Time    ECG 12 Lead [[id number]] Collected:  03/29/19 0738     Updated:  03/30/19 1828    Narrative:       Test Reason : Potential adverse reaction to medications.  Blood Pressure : **/** mmHG  Vent. Rate : 103 BPM     Atrial Rate : 103 BPM     P-R Int : 138 ms          QRS Dur : 080 ms      QT Int : 372 ms       P-R-T Axes : 038 039 013 degrees     QTc Int : 487 ms    Sinus tachycardia  Nonspecific T wave abnormality  Abnormal ECG  No previous ECGs available  Confirmed by Geraldo Bentley ([id number]) on 3/30/2019 6:28:20 PM    Referred By:  ROBERT           Confirmed By:Geraldo Bentley           ALLERGIES: Patient has no known allergies.      Current Facility-Administered Medications:   •  acetaminophen (TYLENOL) 160 MG/5ML solution 650 mg, 650 mg, Oral, Q4H PRN, Yves Vance MD  •  aluminum-magnesium hydroxide-simethicone (MAALOX  MAX) 400-400-40 MG/5ML suspension 15 mL, 15 mL, Oral, Q6H PRN, Yves Vance MD  •  benzonatate (TESSALON) capsule 100 mg, 100 mg, Oral, TID PRN, Yves Vance MD  •  benztropine (COGENTIN) tablet 1 mg, 1 mg, Oral, Daily PRN **OR** benztropine (COGENTIN) injection 0.5 mg, 0.5 mg, Intramuscular, Daily PRN, Yves Vance MD  •  cetirizine (zyrTEC) tablet 10 mg, 10 mg, Oral, Nightly, Yves Vance MD, 10 mg at 19  •  escitalopram (LEXAPRO) tablet 10 mg, 10 mg, Oral, Daily, Sukh Ferris MD  •  famotidine (PEPCID) tablet 20 mg, 20 mg, Oral, BID PRN, Yves Vance MD  •  folic acid (FOLVITE) tablet 1 mg, 1 mg, Oral, Daily, Sukh Ferris MD, 1 mg at 19 0811  •  hydrOXYzine (ATARAX) tablet 50 mg, 50 mg, Oral, Q6H PRN, Yves Vance MD, 50 mg at 19 0812  •  loperamide (IMODIUM) capsule 2 mg, 2 mg, Oral, 4x Daily PRN, Yves Vance MD  •  [] LORazepam (ATIVAN) tablet 2 mg, 2 mg, Oral, Q4H PRN **FOLLOWED BY** [] LORazepam (ATIVAN) tablet 1.5 mg, 1.5 mg, Oral, Q4H PRN **FOLLOWED BY** [] LORazepam (ATIVAN) tablet 1 mg, 1 mg, Oral, Q4H PRN **FOLLOWED BY** LORazepam (ATIVAN) tablet 0.5 mg, 0.5 mg, Oral, Q4H PRN, Yves Vance MD  •  magnesium hydroxide (MILK OF MAGNESIA) suspension 2400 mg/10mL 10 mL, 10 mL, Oral, Daily PRN, Yves Vance MD  •  multivitamin (DAILY KINJAL) tablet 1 tablet, 1 tablet, Oral, Daily, Yves Vance MD, 1 tablet at 19 0811  •  ondansetron (ZOFRAN) tablet 4 mg, 4 mg, Oral, Q6H PRN, Yves Vance MD  •  sodium chloride (OCEAN) nasal spray 2 spray, 2 spray, Each Nare, PRN, Yves Vance MD  •  thiamine (VITAMIN B-1) tablet 100 mg, 100 mg, Oral, Daily, Yves Vance MD, 100 mg at 19 0811  •  traZODone (DESYREL) tablet 50 mg, 50 mg, Oral, Nightly PRN, Yves Vance MD, 50 mg at 19    ASSESSMENT & PLAN:    Alcohol withdrawal syndrome with complication (CMS/HCC)  - Ativan detox       Acute  alcoholic hepatitis  - Supportive treatment       Thrombocytopenia (CMS/HCC)  - Supportive treatment       Depression  - Lexapro 10 mg daily       Anxiety  - Lexapro 10 mg daily  - Hydroxyzine prn       Psoriasis  - Pt may use her home topical medication    Plan discharge tomorrow  Suicide precautions: None    Behavioral Health Treatment Plan and Problem List: I have reviewed and approved the Behavioral Health Treatment Plan and Problem list.  The patient has had a chance to review and agrees with the treatment plan.     Clinician:  Sukh Ferris MD  04/02/19  11:02 AM

## 2019-04-02 NOTE — PROGRESS NOTES
1100  D: Spoke with the patient in the dayroom, discussing aftercare plans. She will be discharged tomorrow. Called [facility] and notified them of the change in plans.    A: Patient's affect appeared somber. She seemed primarily interested in sleeping today, and isolated herself in her room much of the day.     P: The patient will be admitted to [facility] tomorrow.

## 2019-04-03 VITALS
SYSTOLIC BLOOD PRESSURE: 112 MMHG | RESPIRATION RATE: 18 BRPM | OXYGEN SATURATION: 96 % | DIASTOLIC BLOOD PRESSURE: 64 MMHG | HEART RATE: 100 BPM | TEMPERATURE: 96.9 F | HEIGHT: 70 IN | BODY MASS INDEX: 20.62 KG/M2 | WEIGHT: 144 LBS

## 2019-04-03 PROCEDURE — 99238 HOSP IP/OBS DSCHRG MGMT 30/<: CPT | Performed by: PSYCHIATRY & NEUROLOGY

## 2019-04-03 RX ORDER — ESCITALOPRAM OXALATE 10 MG/1
10 TABLET ORAL DAILY
Qty: 30 TABLET | Refills: 0 | Status: SHIPPED | OUTPATIENT
Start: 2019-04-04

## 2019-04-03 RX ADMIN — ESCITALOPRAM OXALATE 10 MG: 10 TABLET ORAL at 08:10

## 2019-04-03 RX ADMIN — Medication 100 MG: at 08:10

## 2019-04-03 RX ADMIN — Medication 1 TABLET: at 08:10

## 2019-04-03 RX ADMIN — FOLIC ACID 1 MG: 1 TABLET ORAL at 08:10

## 2019-04-03 RX ADMIN — HYDROXYZINE HYDROCHLORIDE 50 MG: 50 TABLET, FILM COATED ORAL at 08:11

## 2019-04-03 NOTE — DISCHARGE SUMMARY
"PSYCHIATRIC DISCHARGE SUMMARY     Patient Identification:  Name:  Melba Terry  Age:  23 y.o.  Sex:  female  :  1995  MRN:  [id number]  Visit Number:  [id number]      Date of Admission:3/29/2019   Date of Discharge:  4/3/2019    Discharge Diagnosis:  Active Problems:    Alcohol withdrawal syndrome with complication (CMS/HCC)    Acute alcoholic hepatitis    Thrombocytopenia (CMS/HCC)    Depression    Anxiety    Psoriasis        Admission Diagnosis:  Alcohol abuse [F10.10]     Hospital Course  Patient is a 23 y.o. female presented with alcohol use and withdrawals. The patient was admitted to the [facility] detox recovery unit and started on Ativan detox. She was able to complete the detox without any complications. She was started on Lexapro for depression and she reported she had taken in previously and it had helped.   The patient was also able to take part in individual and group counseling sessions and work on appropriate coping skills.  The patient made steady improvement in her mood and expressed feeling more positive and hopeful about future. Sleep and appetite were improved.  Her plan was to go for residential rehab treatment at [facility] once detox was completed.  The day of discharge the patient was calm, cooperative and pleasant. Mood was reported to be good, and denied SI/HI/AVH. Also reported no medication side effects.  The patient has a history of alcoholic hepatitis and her liver enzymes were elevated, and she had low platelets chronically but no bleeding episodes reported.      Mental Status Exam upon discharge:   Mood \"good\"   Affect-congruent, appropriate, stable  Thought Content-goal directed, no delusional material present  Thought process-linear, organized.  Suicidality: No SI  Homicidality: No HI  Perception: No AH/VH    Procedures Performed         Consults:   Consults     No orders found from 2019 to 3/30/2019.          Pertinent Test Results:   Lab Results (last 7 days) "     Procedure Component Value Units Date/Time    CBC & Differential [[id number]] Collected:  04/01/19 1739    Specimen:  Blood Updated:  04/01/19 1853    Narrative:       The following orders were created for panel order CBC & Differential.  Procedure                               Abnormality         Status                     ---------                               -----------         ------                     Scan Slide[[id number]]                                       Final result               CBC Auto Differential[[id number]]        Abnormal            Final result                 Please view results for these tests on the individual orders.    CBC Auto Differential [[id number]]  (Abnormal) Collected:  04/01/19 1739    Specimen:  Blood Updated:  04/01/19 1853     WBC 7.06 10*3/mm3      RBC 2.53 10*6/mm3      Hemoglobin 9.2 g/dL      Hematocrit 28.1 %      .1 fL      MCH 36.4 pg      MCHC 32.7 g/dL      RDW 14.3 %      RDW-SD 56.1 fl      MPV 10.7 fL      Platelets 58 10*3/mm3      Neutrophil % 67.4 %      Lymphocyte % 19.8 %      Monocyte % 10.8 %      Eosinophil % 1.3 %      Basophil % 0.4 %      Immature Grans % 0.3 %      Neutrophils, Absolute 4.76 10*3/mm3      Lymphocytes, Absolute 1.40 10*3/mm3      Monocytes, Absolute 0.76 10*3/mm3      Eosinophils, Absolute 0.09 10*3/mm3      Basophils, Absolute 0.03 10*3/mm3      Immature Grans, Absolute 0.02 10*3/mm3     Scan Slide [[id number]] Collected:  04/01/19 1739    Specimen:  Blood Updated:  04/01/19 1853     Macrocytes Large/3+     Platelet Estimate Decreased    Magnesium [[id number]]  (Normal) Collected:  03/29/19 0700    Specimen:  Blood Updated:  03/29/19 0736     Magnesium 1.6 mg/dL           Condition on Discharge:  improved    Vital Signs  Temp:  [97.4 °F (36.3 °C)-97.9 °F (36.6 °C)] 97.6 °F (36.4 °C)  Heart Rate:  [] 111  Resp:  [18] 18  BP: (101-144)/(62-93) 144/93      Discharge Disposition:  Home or Self Care    Discharge Medications:      Discharge Medications      New Medications      Instructions Start Date   escitalopram 10 MG tablet  Commonly known as:  LEXAPRO   10 mg, Oral, Daily   Start Date:  4/4/2019            Discharge Diet: Regular    Activity at Discharge: As tolerated    Follow-up Appointments    [facility]    Test Results Pending at Discharge      Clinician:   Sukh Ferris MD  04/03/19  10:31 AM

## 2019-04-03 NOTE — DISCHARGE INSTR - APPOINTMENTS
[facility]  [address] [place] [address].  [place] [address]  [phone]  Admission directly upon discharge.

## 2019-04-03 NOTE — PLAN OF CARE
"Problem: Patient Care Overview  Goal: Plan of Care Review  Outcome: Ongoing (interventions implemented as appropriate)   04/03/19 0225   Coping/Psychosocial   Plan of Care Reviewed With patient   Coping/Psychosocial   Patient Agreement with Plan of Care agrees   Plan of Care Review   Progress improving   OTHER   Outcome Summary Patient was out in the dayroom more this shift. Patient got up took a shower and attended group. Patient is scheduled to be discharging in the A.M. and will be going to [facility] for long-term rehab. Patient is calm and cooperative. She states \"I feeling a lot better, I think it's mostly been depression.\" Rates anxiety 5. Depression 6. Cravings 4. Denies withdrawal symptoms. No problems noted. Will continue to monitor.       Problem: Overarching Goals (Adult)  Goal: Adheres to Safety Considerations for Self and Others  Outcome: Ongoing (interventions implemented as appropriate)    Goal: Optimized Coping Skills in Response to Life Stressors  Outcome: Ongoing (interventions implemented as appropriate)    Goal: Develops/Participates in Therapeutic [place] to Support Successful Transition  Outcome: Ongoing (interventions implemented as appropriate)        "

## 2019-04-03 NOTE — PROGRESS NOTES
Patient is being discharged today.  She will be admitted to [facility] today.  Staff from [facility] will provide transportation.  The patient is agreeable.

## 2019-07-09 ENCOUNTER — OFFICE VISIT (OUTPATIENT)
Dept: GASTROENTEROLOGY | Facility: CLINIC | Age: 24
End: 2019-07-09

## 2019-07-09 ENCOUNTER — LAB (OUTPATIENT)
Dept: LAB | Facility: HOSPITAL | Age: 24
End: 2019-07-09

## 2019-07-09 VITALS
DIASTOLIC BLOOD PRESSURE: 70 MMHG | OXYGEN SATURATION: 100 % | WEIGHT: 141 LBS | HEART RATE: 72 BPM | SYSTOLIC BLOOD PRESSURE: 112 MMHG | HEIGHT: 70 IN | TEMPERATURE: 97.9 F | BODY MASS INDEX: 20.19 KG/M2 | RESPIRATION RATE: 14 BRPM

## 2019-07-09 DIAGNOSIS — K70.10 ACUTE ALCOHOLIC HEPATITIS: ICD-10-CM

## 2019-07-09 DIAGNOSIS — K70.10 ALCOHOLIC HEPATITIS WITHOUT ASCITES: Primary | ICD-10-CM

## 2019-07-09 DIAGNOSIS — K70.10 ALCOHOLIC HEPATITIS WITHOUT ASCITES: ICD-10-CM

## 2019-07-09 LAB
ALBUMIN SERPL-MCNC: 4.1 G/DL (ref 3.5–5.2)
ALBUMIN/GLOB SERPL: 1.9 G/DL
ALP SERPL-CCNC: 129 U/L (ref 39–117)
ALT SERPL W P-5'-P-CCNC: 47 U/L (ref 1–33)
ANION GAP SERPL CALCULATED.3IONS-SCNC: 11.6 MMOL/L (ref 5–15)
AST SERPL-CCNC: 95 U/L (ref 1–32)
BILIRUB SERPL-MCNC: 5.3 MG/DL (ref 0.2–1.2)
BUN BLD-MCNC: 7 MG/DL (ref 6–20)
BUN/CREAT SERPL: 11.5 (ref 7–25)
CALCIUM SPEC-SCNC: 9.2 MG/DL (ref 8.6–10.5)
CHLORIDE SERPL-SCNC: 103 MMOL/L (ref 98–107)
CO2 SERPL-SCNC: 25.4 MMOL/L (ref 22–29)
CREAT BLD-MCNC: 0.61 MG/DL (ref 0.57–1)
DEPRECATED RDW RBC AUTO: 53.8 FL (ref 37–54)
ERYTHROCYTE [DISTWIDTH] IN BLOOD BY AUTOMATED COUNT: 15 % (ref 12.3–15.4)
GFR SERPL CREATININE-BSD FRML MDRD: 120 ML/MIN/1.73
GLOBULIN UR ELPH-MCNC: 2.2 GM/DL
GLUCOSE BLD-MCNC: 85 MG/DL (ref 65–99)
HCT VFR BLD AUTO: 34.9 % (ref 34–46.6)
HGB BLD-MCNC: 11.2 G/DL (ref 12–15.9)
INR PPP: 1.87 (ref 0.85–1.16)
MCH RBC QN AUTO: 31.5 PG (ref 26.6–33)
MCHC RBC AUTO-ENTMCNC: 32.1 G/DL (ref 31.5–35.7)
MCV RBC AUTO: 98 FL (ref 79–97)
PLATELET # BLD AUTO: 126 10*3/MM3 (ref 140–450)
PMV BLD AUTO: 10.5 FL (ref 6–12)
POTASSIUM BLD-SCNC: 4.1 MMOL/L (ref 3.5–5.2)
PROT SERPL-MCNC: 6.3 G/DL (ref 6–8.5)
PROTHROMBIN TIME: 20.7 SECONDS (ref 11.2–14.3)
RBC # BLD AUTO: 3.56 10*6/MM3 (ref 3.77–5.28)
SODIUM BLD-SCNC: 140 MMOL/L (ref 136–145)
WBC NRBC COR # BLD: 7.38 10*3/MM3 (ref 3.4–10.8)

## 2019-07-09 PROCEDURE — 85027 COMPLETE CBC AUTOMATED: CPT

## 2019-07-09 PROCEDURE — 85610 PROTHROMBIN TIME: CPT

## 2019-07-09 PROCEDURE — 99214 OFFICE O/P EST MOD 30 MIN: CPT | Performed by: INTERNAL MEDICINE

## 2019-07-09 PROCEDURE — 80053 COMPREHEN METABOLIC PANEL: CPT

## 2019-07-09 PROCEDURE — 36415 COLL VENOUS BLD VENIPUNCTURE: CPT

## 2019-07-09 NOTE — PROGRESS NOTES
PCP: Carlos Enrique Hook MD    No chief complaint on file.      History of Present Illness:   Melba Terry is a 24 y.o. female who presents to GI clinic as a follow up for alcoholic hepatitis. She was treated with steroids 2018 and skipped an appointment with me. She unfortunately started drinking alcohol again. She was admitted for alcohol withdrawal and detox April 2019. She denies hematemesis or hematochezia. She denies confusion. She complains of severe fatigue.  She complains of turning yellow. She reports drinking again around a month after her last appointment with me. SHe was previously taking neurontin, now off. Reports last drink 3 months ago    Past Medical History:   Diagnosis Date   • Anxiety    • Asthma    • Depression    • Hepatitis     chemical    • Psoriasis    • Substance abuse (CMS/HCC)    • Withdrawal symptoms, alcohol (CMS/HCC)        No past surgical history on file.      Current Outpatient Medications:   •  escitalopram (LEXAPRO) 10 MG tablet, Take 1 tablet by mouth Daily., Disp: 30 tablet, Rfl: 0  •  Multiple Vitamins-Iron (MULTI-VITAMIN/IRON) tablet, Take 1 tablet by mouth Daily., Disp: , Rfl: 1  •  Potassium (POTASSIMIN PO), Take  by mouth., Disp: , Rfl:     No Known Allergies    Family History   Problem Relation Age of Onset   • Heart disease Mother    • Alcohol abuse Father    • Heart disease Father    • Drug abuse Sister        Social History     Socioeconomic History   • Marital status: Single     Spouse name: Not on file   • Number of children: Not on file   • Years of education: Not on file   • Highest education level: Not on file   Tobacco Use   • Smoking status: Never Smoker   • Smokeless tobacco: Never Used   Substance and Sexual Activity   • Alcohol use: No     Comment: 10 shots of fireball    • Drug use: No   • Sexual activity: Defer       Review of Systems   Constitutional: Positive for fatigue.   Eyes:        YELLOWING OF EYES   Musculoskeletal:        SWELLING\   All other  systems reviewed and are negative.        Vitals:    07/09/19 1455   Resp: 14       Physical Exam  General Appearance:  Vitals as above. no acute distress  Head/face:  Normocephalic, atraumatic  Eyes:   EOMI, no conjunctivitis + icteris  Nose/Sinuses:  Nares patent bilaterally without discharge or lesions  Mouth/Throat:  Posterior pharynx is pink without drainage or exudates;  dentition is in good condition and repair  Neck:  trachea is midline, no thyromegaly  Neurologic:  Alert; no focal deficits; age appropriate behavior and speech  Psychiatric: mood and affect are congruent  Skin: + plaque rash on extremity surface, + jaundice  Abdomen: + hepatomegaly and soft/nt      Assessment/Plan  1.) Acute on chronic liver injury, concerning for failure  2.) History of alcoholic hepatitis  3.) Alcohol dependence, in rehab x 3 months  Etiology: alcohol  History of alcoholic hepatitis requiring prednisolone 2018  With persistent jaundice and thrombocytopenia 3 months after last use, I will assess discriminant function score but will also refer her to  for transplant assessment.  Decision to pursue steroids dependend on labs.      David Santiago MD  7/9/2019

## 2019-07-10 DIAGNOSIS — K70.10 ALCOHOLIC HEPATITIS WITHOUT ASCITES: Primary | ICD-10-CM

## 2019-07-10 RX ORDER — PREDNISOLONE SODIUM PHOSPHATE 10 MG/5ML
40 SOLUTION ORAL
Status: SHIPPED | OUTPATIENT
Start: 2019-07-10 | End: 2019-08-07

## 2019-07-12 ENCOUNTER — TELEPHONE (OUTPATIENT)
Dept: GASTROENTEROLOGY | Facility: CLINIC | Age: 24
End: 2019-07-12

## 2019-07-12 NOTE — TELEPHONE ENCOUNTER
MADELYN FROM [employer] CALLED AND SAID SHE WAS TRYING TO GET A HOLD OF PATIENT. SHE STATES WHEN SHE CALL THE PATIENT HAS THE NUMBER BLOCKED PER RECORDING. I ATTEMPTED TO CALL THE PATIENT AND GOT THE SAME RECORDING. MADELYN IS GOING TO SEND HER A LETTER OUT. WE DO NOT HAVE RELEASE SO WE CAN NOT CALL HER EMERGENCY CONTACTS.

## 2019-07-15 ENCOUNTER — TELEPHONE (OUTPATIENT)
Dept: GASTROENTEROLOGY | Facility: CLINIC | Age: 24
End: 2019-07-15

## 2019-07-15 DIAGNOSIS — K70.10 ALCOHOLIC HEPATITIS, UNSPECIFIED WHETHER ASCITES PRESENT: Primary | ICD-10-CM

## 2019-07-15 RX ORDER — PREDNISONE 10 MG/1
TABLET ORAL
Qty: 480 TABLET | Refills: 2 | Status: SHIPPED | OUTPATIENT
Start: 2019-07-15 | End: 2021-01-21

## 2019-07-15 NOTE — TELEPHONE ENCOUNTER
CALLED TO GIVE PATIENT RESULTS; PATIENT WAS BUSY AND WAS NOT ABLE TO COME TO PHONE. WILL ATTEMPT CALL AGAIN TODAY.

## 2019-07-15 NOTE — TELEPHONE ENCOUNTER
----- Message from David Santiago MD sent at 7/10/2019  1:16 PM EDT -----  Her discriminant function score is 34 and she denies signs of infection. Prednisolone x 28 days will be prescribed along with taper. She is to report to ED with fever or worsening clinical state.  She is to have blood drawn in 1 week.

## 2021-01-21 ENCOUNTER — TELEMEDICINE (OUTPATIENT)
Dept: GASTROENTEROLOGY | Facility: CLINIC | Age: 26
End: 2021-01-21

## 2021-01-21 DIAGNOSIS — K62.89 RECTAL IRRITATION: Primary | ICD-10-CM

## 2021-01-21 PROCEDURE — 99213 OFFICE O/P EST LOW 20 MIN: CPT | Performed by: NURSE PRACTITIONER

## 2021-01-21 RX ORDER — GINSENG 100 MG
CAPSULE ORAL 2 TIMES DAILY
Qty: 28 G | Refills: 0 | Status: SHIPPED | OUTPATIENT
Start: 2021-01-21 | End: 2021-01-28

## 2021-01-21 RX ORDER — LAMOTRIGINE 100 MG/1
100 TABLET ORAL DAILY
COMMUNITY

## 2021-01-21 RX ORDER — HYDROXYZINE PAMOATE 50 MG/1
50 CAPSULE ORAL 2 TIMES DAILY
COMMUNITY
Start: 2021-01-11

## 2021-01-21 RX ORDER — NYSTATIN 100000 U/G
CREAM TOPICAL 2 TIMES DAILY
Qty: 15 G | Refills: 0 | Status: SHIPPED | OUTPATIENT
Start: 2021-01-21 | End: 2021-01-31

## 2021-01-21 NOTE — PROGRESS NOTES
Follow Up      Patient Name: Melba Terry  : 1995   MRN: [id number]     Chief Complaint:  No chief complaint on file.  You have chosen to receive care through a telehealth visit.  Do you consent to use a video/audio connection for your medical care today? Yes      History of Present Illness: Melba Terry is a 25 y.o. female who is here today for rectal irritation.  This has been ongoing for about 4 weeks.  It feels as though her perineum is sunburned. There is some bleeding- trace.  Normal BMs now.  No blood in stool.  No fever.  No fam hx of IBD.  Personal hx of psoriasis.  Uses scented female cleanser and shaves the area.  No history of HPV and has been vaccinated.  No history of herpes.  No bulging or rash on her rectum.  She does report a lot of sweating due to working out.    Subjective      Review of Systems:   Review of Systems   Gastrointestinal: Positive for anal bleeding and rectal pain. Negative for blood in stool, constipation, diarrhea and GERD.   Skin: Positive for rash.       Medications:     Current Outpatient Medications:   •  lamoTRIgine (LaMICtal) 100 MG tablet, Take 100 mg by mouth Daily., Disp: , Rfl:   •  bacitracin 500 UNIT/GM ointment, Apply  topically to the appropriate area as directed 2 (Two) Times a Day for 7 days., Disp: 28 g, Rfl: 0  •  escitalopram (LEXAPRO) 10 MG tablet, Take 1 tablet by mouth Daily., Disp: 30 tablet, Rfl: 0  •  hydrOXYzine pamoate (VISTARIL) 50 MG capsule, Take 50 mg by mouth 2 (Two) Times a Day., Disp: , Rfl:   •  Multiple Vitamins-Iron (MULTI-VITAMIN/IRON) tablet, Take 1 tablet by mouth Daily., Disp: , Rfl: 1  •  nystatin (MYCOSTATIN) 958951 UNIT/GM cream, Apply  topically to the appropriate area as directed 2 (two) times a day for 10 days., Disp: 15 g, Rfl: 0  •  Potassium (POTASSIMIN PO), Take  by mouth., Disp: , Rfl:     Allergies:   No Known Allergies    Social History:   Social History     Socioeconomic History   • Marital status: Single      Spouse name: Not on file   • Number of children: Not on file   • Years of education: Not on file   • Highest education level: Not on file   Tobacco Use   • Smoking status: Never Smoker   • Smokeless tobacco: Never Used   Substance and Sexual Activity   • Alcohol use: No     Comment: 10 shots of fireball(PATIENT REPORTS NO ALCHOL USE IN LAST THREE MONTHS. SINCE 03/28/2019    • Drug use: No   • Sexual activity: Defer        Surgical History:   History reviewed. No pertinent surgical history.     Medical History:   Past Medical History:   Diagnosis Date   • Anxiety    • Asthma    • Depression    • Hepatitis     chemical    • Psoriasis    • Substance abuse (CMS/HCC)    • Withdrawal symptoms, alcohol (CMS/HCC)         Objective     Physical Exam:  Vital Signs: There were no vitals filed for this visit.  There is no height or weight on file to calculate BMI.     Physical Exam  Constitutional:       General: She is not in acute distress.     Appearance: She is well-developed.   Pulmonary:      Effort: Pulmonary effort is normal. No accessory muscle usage or respiratory distress.   Skin:     Coloration: Skin is not pale.      Findings: No erythema.   Neurological:      Mental Status: She is alert and oriented to person, place, and time.   Psychiatric:         Speech: Speech normal.         Behavior: Behavior normal.         Thought Content: Thought content normal.         Judgment: Judgment normal.         Assessment / Plan      Assessment/Plan:   Diagnoses and all orders for this visit:    1. Rectal irritation (Primary)  -     bacitracin 500 UNIT/GM ointment; Apply  topically to the appropriate area as directed 2 (Two) Times a Day for 7 days.  Dispense: 28 g; Refill: 0  -     nystatin (MYCOSTATIN) 525398 UNIT/GM cream; Apply  topically to the appropriate area as directed 2 (two) times a day for 10 days.  Dispense: 15 g; Refill: 0    Difficult to assess due to necessity of video visit.  For now, she will discontinue scented  soap and use sensitive skin nonscented soap on the area.  She will avoid shaving the area and will treat for fungal and bacterial infection due to sweating from exercise.  We discussed that IBD can present with rectal symptoms and she may be at increased risk due to her psoriasis history, therefore she will follow-up back in person if no improvement within 7 days.      Of note has been sober for the past 2 years.  Has liver labs performed by PCP every 6 months.      Follow Up:   Return if symptoms worsen or fail to improve.    Plan of care reviewed with the patient at the conclusion of today's visit.  Education was provided regarding diagnosis, management, and any prescribed or recommended OTC medications.  Patient verbalized understanding of and agreement with management plan.     TAMERA Gomez  [facility]     Please note that portions of this note may have been completed with a voice recognition program. Efforts were made to edit the dictations, but occasionally words are mistranscribed.